# Patient Record
Sex: MALE | Race: WHITE | NOT HISPANIC OR LATINO | URBAN - METROPOLITAN AREA
[De-identification: names, ages, dates, MRNs, and addresses within clinical notes are randomized per-mention and may not be internally consistent; named-entity substitution may affect disease eponyms.]

---

## 2018-07-06 PROBLEM — Z00.00 ENCOUNTER FOR PREVENTIVE HEALTH EXAMINATION: Status: ACTIVE | Noted: 2018-07-06

## 2018-09-12 ENCOUNTER — EMERGENCY (EMERGENCY)
Facility: HOSPITAL | Age: 63
LOS: 1 days | Discharge: HOME | End: 2018-09-12
Attending: EMERGENCY MEDICINE
Payer: COMMERCIAL

## 2018-09-12 VITALS
RESPIRATION RATE: 18 BRPM | DIASTOLIC BLOOD PRESSURE: 97 MMHG | SYSTOLIC BLOOD PRESSURE: 159 MMHG | OXYGEN SATURATION: 100 % | TEMPERATURE: 98 F | WEIGHT: 255.07 LBS | HEIGHT: 72 IN | HEART RATE: 77 BPM

## 2018-09-12 PROCEDURE — 99283 EMERGENCY DEPT VISIT LOW MDM: CPT

## 2018-09-12 PROCEDURE — 99283 EMERGENCY DEPT VISIT LOW MDM: CPT | Mod: GC

## 2018-09-12 RX ORDER — IBUPROFEN 200 MG
800 TABLET ORAL ONCE
Qty: 0 | Refills: 0 | Status: COMPLETED | OUTPATIENT
Start: 2018-09-12 | End: 2018-09-12

## 2018-09-12 RX ORDER — DIAZEPAM 5 MG
10 TABLET ORAL ONCE
Qty: 0 | Refills: 0 | Status: DISCONTINUED | OUTPATIENT
Start: 2018-09-12 | End: 2018-09-12

## 2018-09-12 RX ORDER — DIAZEPAM 5 MG
1 TABLET ORAL
Qty: 10 | Refills: 0 | OUTPATIENT
Start: 2018-09-12 | End: 2018-09-15

## 2018-09-12 RX ORDER — ACETAMINOPHEN 500 MG
975 TABLET ORAL ONCE
Qty: 0 | Refills: 0 | Status: COMPLETED | OUTPATIENT
Start: 2018-09-12 | End: 2018-09-12

## 2018-09-12 RX ADMIN — Medication 800 MILLIGRAM(S): at 16:19

## 2018-09-12 RX ADMIN — Medication 975 MILLIGRAM(S): at 16:19

## 2018-09-12 RX ADMIN — Medication 10 MILLIGRAM(S): at 16:19

## 2018-09-12 NOTE — ED PROVIDER NOTE - OBJECTIVE STATEMENT
Skip Ramirez MD: 62 M w/ Hx of lumbar fusion in 2003, hypertension, compression fracture of thoracic vertebrae, hyperlipidemia, , who presents with acute worsening of mid to lower back pain while patient was sitting and waiting to see his doctor for an unrelated issue, worsened as he stood up. Associated with spasms along the back. No fever or chills, no numbness or weakness, no urinary or fecal retention or incontinence, no saddle anesthesia.   Ortho-Spine: Rordigo Landon Skip Ramirez MD: 62 M w/ Hx of lumbar fusion in 2003, hypertension, compression fracture of thoracic vertebrae, hyperlipidemia, , who presents with acute worsening of mid to lower back pain while patient was sitting and waiting to see his doctor for an unrelated issue, worsened as he stood up. Associated with spasms along the back. No fever or chills, no numbness or weakness, no urinary or fecal retention or incontinence, no saddle anesthesia.   Ortho-Spine: Rodrigo Landon       Attending note. Patient was seen in fast track from #3. Agree with the above. Patient complaining of severe spasms and pain in the middle of his back which radiated around to both sides of his abdomen. Patient has been having back pain since March and was initially treated as back spasms. Patient was evaluated and had an MRI of his spine in July which showed compression fracture of T12 as well as quad lesions in T10, 11 and 12.  Patient states that orthopedist told him it was too late to be braced due to time delay. Patient denies any numbness or paresthesia. Patient denies any bowel or bladder dysfunction. Pain is sharp electric shocklike and worse with movement. Patient has allergies to oxycodone and morphine. Patient is currently taking tramadol only for back pain.

## 2018-09-12 NOTE — ED PROVIDER NOTE - NS ED ROS FT
Review of Systems: No fever, no chills, no numbness, no weakness, + back pain, no chest pain, no shortness of breath, no loss of consciousness. ~ Skip Ramirez MD

## 2018-09-12 NOTE — ED PROVIDER NOTE - PLAN OF CARE
1) Please follow-up with your Primary Medical Doctor in 3-5 days. Please call the [facility] for further evaluation and treatment. Their phone number is [phone].   2) Return to the Emergency Department if you experiences: fevers, chills, numbness, weakness, or symptoms that are new or recurrent.  3) To alleviate the pain, please rest and take Tylenol 650 mg or Motrin 400 mg once every 6 hours as needed. If over-the-counter pain medications do not alleviate the pain, you may take Valium once every 8 hours as needed. Do not drive, swim, or operate heavy machinery while you are taking this medication as it can cause drowsiness. Use Salonpas lidocaine patch as directed on the over-the-counter medication instructions.

## 2018-09-12 NOTE — ED PROVIDER NOTE - PHYSICAL EXAMINATION
Physical Exam:   GEN: adult M who is in mild distress, AAOx3  HENT: NCAT, no stridor  EYES: PERRLA, EOMI  RESP: no resp distress  CV: normal rate and normal perfusion  ABD: nondistended  EXT: No edema, No bony deformity of extremities  MSK: no midline TTP to T or L spine, + spasms along paralumbar musculature  SKIN: No skin breaks, skin color normal for race  NEURO: CN grossly intact, No focal motor or sensory deficits in BLE with normal patellar and achilles reflexes, normal sensation in saddle region.   ~ Skip Ramirez MD Physical Exam:   GEN: adult M who is in mild distress, AAOx3  HENT: NCAT, no stridor  EYES: PERRLA, EOMI  RESP: no resp distress  CV: normal rate and normal perfusion  ABD: nondistended  EXT: No edema, No bony deformity of extremities  MSK: no midline TTP to T or L spine, + spasms along paralumbar musculature  SKIN: No skin breaks, skin color normal for race  NEURO: CN grossly intact, No focal motor or sensory deficits in BLE with normal patellar and achilles reflexes, normal sensation in saddle region.   ~ Skip Ramirez MD       Attending note. The patient is alert and in severe pain grimacing at times.  Chest and abdomen are nontender. Patient has tenderness in the lower thoracic spine and para thoracic muscles. This reproduces the patient's symptoms. There is no leg edema. Sensation is intact in all. DTRs are +2/4 equal and symmetrical. There is no clonus. Movement exacerbates the patient's back pain.

## 2018-09-12 NOTE — ED PROVIDER NOTE - PROGRESS NOTE DETAILS
Skip Ramirez MD: spoke w/ Dr. Landon's office, left message w/ , awaiting call back. Skip Ramirez MD: patient improved symptoms after medications. repeat examination without neuro deficits. ambulatory w/o deficits. continues to have no midline bony TTP to vertebrae. No call back from Barbi. Skip Ramirez MD: patient already discharged and left the ED when Dr. Landon called back. He was made aware of the patients presentation and worsening symptoms. He will f/u w/ him closely as an outpatient.

## 2018-09-12 NOTE — ED PROVIDER NOTE - MEDICAL DECISION MAKING DETAILS
Skip Ramirez MD: Atraumatic back pain with associated spasms.  no red flags concerning for a spinal cord compression or cauda equina. No infectious symptoms. Exam concerning for muscular spasm of the paraspinal muscles. Will give muscle relaxants with oral Valium along with oral pain control. No indication for imaging at this time. Skip Ramirez MD: Atraumatic back pain with associated spasms.  no red flags concerning for a spinal cord compression or cauda equina. No infectious symptoms. Exam concerning for muscular spasm of the paraspinal muscles. Will give muscle relaxants with oral Valium along with oral pain control. No indication for imaging at this time.       Attending note. Patient complaining of severe back pain. MRI was reviewed which showed compression fracture of T9 and lesions of T10-T11. Analgesia. There is no IV Valium available in the hospital. Called to Dr. Tomi Landon for recommendations if available.

## 2018-09-12 NOTE — ED PROVIDER NOTE - CARE PLAN
Principal Discharge DX:	Other acute back pain  Assessment and plan of treatment:	1) Please follow-up with your Primary Medical Doctor in 3-5 days. Please call the [facility] for further evaluation and treatment. Their phone number is [phone].   2) Return to the Emergency Department if you experiences: fevers, chills, numbness, weakness, or symptoms that are new or recurrent.  3) To alleviate the pain, please rest and take Tylenol 650 mg or Motrin 400 mg once every 6 hours as needed. If over-the-counter pain medications do not alleviate the pain, you may take Valium once every 8 hours as needed. Do not drive, swim, or operate heavy machinery while you are taking this medication as it can cause drowsiness. Use Salonpas lidocaine patch as directed on the over-the-counter medication instructions.

## 2018-09-22 ENCOUNTER — TRANSCRIPTION ENCOUNTER (OUTPATIENT)
Age: 63
End: 2018-09-22

## 2018-09-22 ENCOUNTER — INPATIENT (INPATIENT)
Facility: HOSPITAL | Age: 63
LOS: 8 days | Discharge: HOME | DRG: 456 | End: 2018-10-01
Attending: ORTHOPAEDIC SURGERY | Admitting: ORTHOPAEDIC SURGERY
Payer: COMMERCIAL

## 2018-09-22 VITALS
TEMPERATURE: 98 F | HEIGHT: 72 IN | RESPIRATION RATE: 20 BRPM | OXYGEN SATURATION: 97 % | HEART RATE: 85 BPM | WEIGHT: 255.07 LBS | SYSTOLIC BLOOD PRESSURE: 128 MMHG | DIASTOLIC BLOOD PRESSURE: 85 MMHG

## 2018-09-22 LAB
ALBUMIN SERPL ELPH-MCNC: 4 G/DL — SIGNIFICANT CHANGE UP (ref 3.3–5)
ALP SERPL-CCNC: 64 U/L — SIGNIFICANT CHANGE UP (ref 40–120)
ALT FLD-CCNC: 20 U/L — SIGNIFICANT CHANGE UP (ref 10–45)
ANION GAP SERPL CALC-SCNC: 12 MMOL/L — SIGNIFICANT CHANGE UP (ref 5–17)
APTT BLD: 34.6 SEC — SIGNIFICANT CHANGE UP (ref 27.5–37.4)
AST SERPL-CCNC: 22 U/L — SIGNIFICANT CHANGE UP (ref 10–40)
BASOPHILS # BLD AUTO: 0 K/UL — SIGNIFICANT CHANGE UP (ref 0–0.2)
BASOPHILS NFR BLD AUTO: 0.6 % — SIGNIFICANT CHANGE UP (ref 0–2)
BILIRUB SERPL-MCNC: 0.7 MG/DL — SIGNIFICANT CHANGE UP (ref 0.2–1.2)
BLD GP AB SCN SERPL QL: NEGATIVE — SIGNIFICANT CHANGE UP
BUN SERPL-MCNC: 14 MG/DL — SIGNIFICANT CHANGE UP (ref 7–23)
CALCIUM SERPL-MCNC: 10.4 MG/DL — SIGNIFICANT CHANGE UP (ref 8.4–10.5)
CHLORIDE SERPL-SCNC: 100 MMOL/L — SIGNIFICANT CHANGE UP (ref 96–108)
CO2 SERPL-SCNC: 28 MMOL/L — SIGNIFICANT CHANGE UP (ref 22–31)
CREAT SERPL-MCNC: 1 MG/DL — SIGNIFICANT CHANGE UP (ref 0.5–1.3)
EOSINOPHIL # BLD AUTO: 0.1 K/UL — SIGNIFICANT CHANGE UP (ref 0–0.5)
EOSINOPHIL NFR BLD AUTO: 1.3 % — SIGNIFICANT CHANGE UP (ref 0–6)
GLUCOSE SERPL-MCNC: 95 MG/DL — SIGNIFICANT CHANGE UP (ref 70–99)
HCT VFR BLD CALC: 40.4 % — SIGNIFICANT CHANGE UP (ref 39–50)
HGB BLD-MCNC: 14.8 G/DL — SIGNIFICANT CHANGE UP (ref 13–17)
INR BLD: 1.09 RATIO — SIGNIFICANT CHANGE UP (ref 0.88–1.16)
LYMPHOCYTES # BLD AUTO: 1.5 K/UL — SIGNIFICANT CHANGE UP (ref 1–3.3)
LYMPHOCYTES # BLD AUTO: 20.9 % — SIGNIFICANT CHANGE UP (ref 13–44)
MCHC RBC-ENTMCNC: 33.9 PG — SIGNIFICANT CHANGE UP (ref 27–34)
MCHC RBC-ENTMCNC: 36.5 GM/DL — HIGH (ref 32–36)
MCV RBC AUTO: 92.8 FL — SIGNIFICANT CHANGE UP (ref 80–100)
MONOCYTES # BLD AUTO: 0.7 K/UL — SIGNIFICANT CHANGE UP (ref 0–0.9)
MONOCYTES NFR BLD AUTO: 9.4 % — SIGNIFICANT CHANGE UP (ref 2–14)
NEUTROPHILS # BLD AUTO: 4.8 K/UL — SIGNIFICANT CHANGE UP (ref 1.8–7.4)
NEUTROPHILS NFR BLD AUTO: 67.8 % — SIGNIFICANT CHANGE UP (ref 43–77)
PLATELET # BLD AUTO: 244 K/UL — SIGNIFICANT CHANGE UP (ref 150–400)
POTASSIUM SERPL-MCNC: 4.1 MMOL/L — SIGNIFICANT CHANGE UP (ref 3.5–5.3)
POTASSIUM SERPL-SCNC: 4.1 MMOL/L — SIGNIFICANT CHANGE UP (ref 3.5–5.3)
PROT SERPL-MCNC: 7.1 G/DL — SIGNIFICANT CHANGE UP (ref 6–8.3)
PROTHROM AB SERPL-ACNC: 11.9 SEC — SIGNIFICANT CHANGE UP (ref 9.8–12.7)
RBC # BLD: 4.35 M/UL — SIGNIFICANT CHANGE UP (ref 4.2–5.8)
RBC # FLD: 12.5 % — SIGNIFICANT CHANGE UP (ref 10.3–14.5)
RH IG SCN BLD-IMP: NEGATIVE — SIGNIFICANT CHANGE UP
SODIUM SERPL-SCNC: 140 MMOL/L — SIGNIFICANT CHANGE UP (ref 135–145)
WBC # BLD: 7.1 K/UL — SIGNIFICANT CHANGE UP (ref 3.8–10.5)
WBC # FLD AUTO: 7.1 K/UL — SIGNIFICANT CHANGE UP (ref 3.8–10.5)

## 2018-09-22 PROCEDURE — 72070 X-RAY EXAM THORAC SPINE 2VWS: CPT | Mod: 26

## 2018-09-22 PROCEDURE — 99285 EMERGENCY DEPT VISIT HI MDM: CPT | Mod: 25

## 2018-09-22 PROCEDURE — 93010 ELECTROCARDIOGRAM REPORT: CPT | Mod: NC

## 2018-09-22 PROCEDURE — 72100 X-RAY EXAM L-S SPINE 2/3 VWS: CPT | Mod: 26

## 2018-09-22 PROCEDURE — 72040 X-RAY EXAM NECK SPINE 2-3 VW: CPT | Mod: 26

## 2018-09-22 PROCEDURE — 71045 X-RAY EXAM CHEST 1 VIEW: CPT | Mod: 26

## 2018-09-22 RX ORDER — IBUPROFEN 200 MG
600 TABLET ORAL ONCE
Qty: 0 | Refills: 0 | Status: COMPLETED | OUTPATIENT
Start: 2018-09-22 | End: 2018-09-22

## 2018-09-22 RX ORDER — DIAZEPAM 5 MG
5 TABLET ORAL ONCE
Qty: 0 | Refills: 0 | Status: DISCONTINUED | OUTPATIENT
Start: 2018-09-22 | End: 2018-09-22

## 2018-09-22 RX ORDER — ACETAMINOPHEN 500 MG
975 TABLET ORAL ONCE
Qty: 0 | Refills: 0 | Status: COMPLETED | OUTPATIENT
Start: 2018-09-22 | End: 2018-09-22

## 2018-09-22 RX ADMIN — Medication 600 MILLIGRAM(S): at 20:02

## 2018-09-22 RX ADMIN — Medication 975 MILLIGRAM(S): at 20:02

## 2018-09-22 RX ADMIN — Medication 5 MILLIGRAM(S): at 20:02

## 2018-09-22 NOTE — ED ADULT NURSE NOTE - CHPI ED NUR SYMPTOMS NEG
no blurred vision/no dizziness/no loss of consciousness/no numbness/no change in level of consciousness/no fever/no nausea/no vomiting/no confusion

## 2018-09-22 NOTE — ED PROVIDER NOTE - OBJECTIVE STATEMENT
62M PSH lumbar spine fusion 2003 and recent T12 compression fracture c/o LE weakness x2 days. Pt was in ER recently for back pain. Has been following up with pain management. Over past few days pt reports increasing weakness in his legs. Is now walking around with cane then walker with help of family around house. Reports for last two days he has been having urinary incontinence at night. Reports having sensation he needs to urinate, but is incontinent before reaching the bathroom. No bowel incontinence. No saddle anesthesia. No f/c, n/v/d.

## 2018-09-22 NOTE — ED ADULT NURSE NOTE - NSIMPLEMENTINTERV_GEN_ALL_ED
Implemented All Fall Risk Interventions:  [place] to call system. Call bell, personal items and telephone within reach. Instruct patient to call for assistance. Room bathroom lighting operational. Non-slip footwear when patient is off stretcher. Physically safe environment: no spills, clutter or unnecessary equipment. Stretcher in lowest position, wheels locked, appropriate side rails in place. Provide visual cue, wrist band, yellow gown, etc. Monitor gait and stability. Monitor for mental status changes and reorient to person, place, and time. Review medications for side effects contributing to fall risk. Reinforce activity limits and safety measures with patient and family.

## 2018-09-22 NOTE — ED ADULT NURSE NOTE - OBJECTIVE STATEMENT
Pt is a 63 yo M who came to the ED amb with assistance c/o weakness, numbness in legs and loss of bladder control. States he was dx with T9 compression fx in 7/18 though he had pain in the area since 3/18. Pt seen here 9/12 for intractable pain, given Valium with relief and D/c home. Since 9/15 pt has weakness in legs, feels like legs are "falling asleep", now has numbness in legs and had loss of bladder control x3 yesterday and x 2 today. Pt has difficulty ambulating, has to be assisted by at least 2 others to walk.; Pt having increased falls this week. A/o x3. + weakness in legs.

## 2018-09-22 NOTE — ED PROVIDER NOTE - NS ED ROS FT
Constitution: No Fever or chills  Eyes: No visual changes  HEENT: No URI symptoms  Cardio: No Chest pain  Resp: No SOB  GI: No abdominal pain  : +urinary incontinence, no dysuria, no urgency   MSK: +Back pain  Neuro: +weakness in legs, numbness in L leg, no Headache  Skin: No rashes  Reyes Chou, PGY-1

## 2018-09-22 NOTE — CHART NOTE - NSCHARTNOTEFT_GEN_A_CORE
Patient seen and examined  C/O balance issues and weakness in b/l le, numbness LLE>RLE, Urinary Urgency x 3 days,  Full Note to follow  Awaiting MRI C/T/L Spine

## 2018-09-22 NOTE — ED ADULT TRIAGE NOTE - CHIEF COMPLAINT QUOTE
Diagnosed with T9 vertebra fx in July. C.o numbness in legs since Saturday, can not control bladder for the past two days and abd pain. Can feel sensation to LE, warmth on palpation.

## 2018-09-22 NOTE — ED PROVIDER NOTE - PHYSICAL EXAMINATION
General: Alert and Orientated x 3.  Head: Normocephalic and atraumatic.  Eyes: PERRL with EOMI.  Neck: Supple. Trachea midline.   Cardiac: Normal S1 and S2 w/ RRR. No murmurs appreciated. No JVD appreciated.  Pulmonary: Vesicular breath sounds bilaterally. No increased WOB. No wheezes or crackles.  Abdominal: Soft, non-tender.   Neurologic: Strength 4/5 in LLE, 5/5 in URE. Sensation intact bilaterally. Strength 5/5 in upper extremities, No focal sensory or motor deficits.  Musculoskeletal: Strength appropriate in all 4 extremities for age with no limited ROM.  Skin: Color appropriate for race. Intact, warm, and well-perfused.  Lymphatic: No cervical adenopathy appreciated.  Psychiatric: Appropriate mood and affect. No apparent risk to self or others.  Reyes Chou, PGY-1 General: Alert and Orientated x 3.  Head: Normocephalic and atraumatic.  Eyes: PERRL with EOMI.  Neck: Supple. Trachea midline.   Cardiac: Normal S1 and S2 w/ RRR. No murmurs appreciated. No JVD appreciated.  Pulmonary: Vesicular breath sounds bilaterally. No increased WOB. No wheezes or crackles.  Abdominal: Soft, non-tender.   Neurologic: Strength 3/5 in LLE, 5/5 in LRE. Sensation intact bilaterally. Strength 5/5 in upper extremities, rectal tone nl, No saddle anesthesia. No focal sensory or motor deficits.  Musculoskeletal: Strength appropriate in all 4 extremities for age with no limited ROM.  Skin: Color appropriate for race. Intact, warm, and well-perfused.  Lymphatic: No cervical adenopathy appreciated.  Psychiatric: Appropriate mood and affect. No apparent risk to self or others.  Reyes Chou, PGY-1 General: Alert and Orientated x 3.  Head: Normocephalic and atraumatic.  Eyes: PERRL with EOMI.  Neck: Supple. Trachea midline.   Cardiac: Normal S1 and S2 w/ RRR. No murmurs appreciated. No JVD appreciated.  Pulmonary: Vesicular breath sounds bilaterally. No increased WOB. No wheezes or crackles.  Abdominal: Soft, non-tender.   Neurologic: Strength 3/5 in LLE, 5/5 in LRE. Sensation intact bilaterally. Strength 5/5 in upper extremities, rectal tone nl, No saddle anesthesia.   Musculoskeletal: no limited ROM.  Skin: Color appropriate for race. Intact, warm, and well-perfused.  Lymphatic: No cervical adenopathy appreciated.  Psychiatric: Appropriate mood and affect. No apparent risk to self or others.  Reyes Chou, PGY-1

## 2018-09-22 NOTE — ED PROVIDER NOTE - ATTENDING CONTRIBUTION TO CARE
Attending Note (Cosmo): patient complaining of back pain and left leg weakness and urinary incontinence. left leg weaker than right. spine surgery consult, mri.

## 2018-09-22 NOTE — ED PROVIDER NOTE - MEDICAL DECISION MAKING DETAILS
62M PSH lumbar spine fusion, recent T12 compression fracture p/w increasing leg weakness and bladder incontinence for 2 day. Displays some weakness in LLE which appears to be new. Rectal tone nl, no saddle anesthesia. Pain control- reevaluate. Spine recs, +/-MRI if no improvement. 62M PSH lumbar spine fusion, recent T12 compression fracture p/w increasing leg weakness and bladder incontinence for 2 day. Displays some weakness in LLE which appears to be new. Rectal tone nl, no saddle anesthesia. Pain control- reevaluate. Spine recs, MRI to r/o cord compression

## 2018-09-23 ENCOUNTER — RESULT REVIEW (OUTPATIENT)
Age: 63
End: 2018-09-23

## 2018-09-23 DIAGNOSIS — G95.20 UNSPECIFIED CORD COMPRESSION: ICD-10-CM

## 2018-09-23 DIAGNOSIS — K64.9 UNSPECIFIED HEMORRHOIDS: Chronic | ICD-10-CM

## 2018-09-23 DIAGNOSIS — Z90.79 ACQUIRED ABSENCE OF OTHER GENITAL ORGAN(S): Chronic | ICD-10-CM

## 2018-09-23 DIAGNOSIS — M48.061 SPINAL STENOSIS, LUMBAR REGION WITHOUT NEUROGENIC CLAUDICATION: Chronic | ICD-10-CM

## 2018-09-23 DIAGNOSIS — E32.9 DISEASE OF THYMUS, UNSPECIFIED: Chronic | ICD-10-CM

## 2018-09-23 DIAGNOSIS — Z98.890 OTHER SPECIFIED POSTPROCEDURAL STATES: Chronic | ICD-10-CM

## 2018-09-23 LAB
ALBUMIN SERPL ELPH-MCNC: 3.7 G/DL — SIGNIFICANT CHANGE UP (ref 3.3–5)
ALP SERPL-CCNC: 61 U/L — SIGNIFICANT CHANGE UP (ref 40–120)
ALT FLD-CCNC: 18 U/L — SIGNIFICANT CHANGE UP (ref 10–45)
ANION GAP SERPL CALC-SCNC: 10 MMOL/L — SIGNIFICANT CHANGE UP (ref 5–17)
ANION GAP SERPL CALC-SCNC: 12 MMOL/L — SIGNIFICANT CHANGE UP (ref 5–17)
APTT BLD: 35.2 SEC — SIGNIFICANT CHANGE UP (ref 27.5–37.4)
APTT BLD: 56.9 SEC — HIGH (ref 27.5–37.4)
AST SERPL-CCNC: 20 U/L — SIGNIFICANT CHANGE UP (ref 10–40)
BILIRUB SERPL-MCNC: 0.7 MG/DL — SIGNIFICANT CHANGE UP (ref 0.2–1.2)
BLD GP AB SCN SERPL QL: NEGATIVE — SIGNIFICANT CHANGE UP
BUN SERPL-MCNC: 14 MG/DL — SIGNIFICANT CHANGE UP (ref 7–23)
BUN SERPL-MCNC: 15 MG/DL — SIGNIFICANT CHANGE UP (ref 7–23)
CALCIUM SERPL-MCNC: 9.6 MG/DL — SIGNIFICANT CHANGE UP (ref 8.4–10.5)
CALCIUM SERPL-MCNC: 9.9 MG/DL — SIGNIFICANT CHANGE UP (ref 8.4–10.5)
CHLORIDE SERPL-SCNC: 100 MMOL/L — SIGNIFICANT CHANGE UP (ref 96–108)
CHLORIDE SERPL-SCNC: 102 MMOL/L — SIGNIFICANT CHANGE UP (ref 96–108)
CO2 SERPL-SCNC: 24 MMOL/L — SIGNIFICANT CHANGE UP (ref 22–31)
CO2 SERPL-SCNC: 26 MMOL/L — SIGNIFICANT CHANGE UP (ref 22–31)
CREAT SERPL-MCNC: 0.95 MG/DL — SIGNIFICANT CHANGE UP (ref 0.5–1.3)
CREAT SERPL-MCNC: 1.03 MG/DL — SIGNIFICANT CHANGE UP (ref 0.5–1.3)
GAS PNL BLDA: SIGNIFICANT CHANGE UP
GLUCOSE SERPL-MCNC: 112 MG/DL — HIGH (ref 70–99)
GLUCOSE SERPL-MCNC: 143 MG/DL — HIGH (ref 70–99)
HCT VFR BLD CALC: 39.1 % — SIGNIFICANT CHANGE UP (ref 39–50)
HCT VFR BLD CALC: 40.4 % — SIGNIFICANT CHANGE UP (ref 39–50)
HGB BLD-MCNC: 14.2 G/DL — SIGNIFICANT CHANGE UP (ref 13–17)
HGB BLD-MCNC: 14.6 G/DL — SIGNIFICANT CHANGE UP (ref 13–17)
INR BLD: 1.11 RATIO — SIGNIFICANT CHANGE UP (ref 0.88–1.16)
INR BLD: 1.14 RATIO — SIGNIFICANT CHANGE UP (ref 0.88–1.16)
MAGNESIUM SERPL-MCNC: 2 MG/DL — SIGNIFICANT CHANGE UP (ref 1.6–2.6)
MCHC RBC-ENTMCNC: 33 PG — SIGNIFICANT CHANGE UP (ref 27–34)
MCHC RBC-ENTMCNC: 33.6 PG — SIGNIFICANT CHANGE UP (ref 27–34)
MCHC RBC-ENTMCNC: 36 GM/DL — SIGNIFICANT CHANGE UP (ref 32–36)
MCHC RBC-ENTMCNC: 36.3 GM/DL — HIGH (ref 32–36)
MCV RBC AUTO: 91.6 FL — SIGNIFICANT CHANGE UP (ref 80–100)
MCV RBC AUTO: 92.6 FL — SIGNIFICANT CHANGE UP (ref 80–100)
PHOSPHATE SERPL-MCNC: 4.9 MG/DL — HIGH (ref 2.5–4.5)
PLATELET # BLD AUTO: 218 K/UL — SIGNIFICANT CHANGE UP (ref 150–400)
PLATELET # BLD AUTO: 271 K/UL — SIGNIFICANT CHANGE UP (ref 150–400)
POTASSIUM SERPL-MCNC: 4 MMOL/L — SIGNIFICANT CHANGE UP (ref 3.5–5.3)
POTASSIUM SERPL-MCNC: 4.1 MMOL/L — SIGNIFICANT CHANGE UP (ref 3.5–5.3)
POTASSIUM SERPL-SCNC: 4 MMOL/L — SIGNIFICANT CHANGE UP (ref 3.5–5.3)
POTASSIUM SERPL-SCNC: 4.1 MMOL/L — SIGNIFICANT CHANGE UP (ref 3.5–5.3)
PROT SERPL-MCNC: 6.5 G/DL — SIGNIFICANT CHANGE UP (ref 6–8.3)
PROTHROM AB SERPL-ACNC: 12.1 SEC — SIGNIFICANT CHANGE UP (ref 9.8–12.7)
PROTHROM AB SERPL-ACNC: 12.4 SEC — SIGNIFICANT CHANGE UP (ref 9.8–12.7)
RBC # BLD: 4.22 M/UL — SIGNIFICANT CHANGE UP (ref 4.2–5.8)
RBC # BLD: 4.42 M/UL — SIGNIFICANT CHANGE UP (ref 4.2–5.8)
RBC # FLD: 11.8 % — SIGNIFICANT CHANGE UP (ref 10.3–14.5)
RBC # FLD: 12.3 % — SIGNIFICANT CHANGE UP (ref 10.3–14.5)
RH IG SCN BLD-IMP: NEGATIVE — SIGNIFICANT CHANGE UP
SODIUM SERPL-SCNC: 134 MMOL/L — LOW (ref 135–145)
SODIUM SERPL-SCNC: 140 MMOL/L — SIGNIFICANT CHANGE UP (ref 135–145)
WBC # BLD: 13.5 K/UL — HIGH (ref 3.8–10.5)
WBC # BLD: 6 K/UL — SIGNIFICANT CHANGE UP (ref 3.8–10.5)
WBC # FLD AUTO: 13.5 K/UL — HIGH (ref 3.8–10.5)
WBC # FLD AUTO: 6 K/UL — SIGNIFICANT CHANGE UP (ref 3.8–10.5)

## 2018-09-23 PROCEDURE — 72128 CT CHEST SPINE W/O DYE: CPT | Mod: 26

## 2018-09-23 PROCEDURE — 88307 TISSUE EXAM BY PATHOLOGIST: CPT | Mod: 26

## 2018-09-23 PROCEDURE — 88342 IMHCHEM/IMCYTCHM 1ST ANTB: CPT | Mod: 26,59

## 2018-09-23 PROCEDURE — 88365 INSITU HYBRIDIZATION (FISH): CPT | Mod: 26

## 2018-09-23 PROCEDURE — 88341 IMHCHEM/IMCYTCHM EA ADD ANTB: CPT | Mod: 26,59

## 2018-09-23 PROCEDURE — 72141 MRI NECK SPINE W/O DYE: CPT | Mod: 26

## 2018-09-23 PROCEDURE — 72148 MRI LUMBAR SPINE W/O DYE: CPT | Mod: 26

## 2018-09-23 PROCEDURE — 72157 MRI CHEST SPINE W/O & W/DYE: CPT | Mod: 26

## 2018-09-23 PROCEDURE — 88331 PATH CONSLTJ SURG 1 BLK 1SPC: CPT | Mod: 26

## 2018-09-23 PROCEDURE — 88360 TUMOR IMMUNOHISTOCHEM/MANUAL: CPT | Mod: 26

## 2018-09-23 PROCEDURE — 99291 CRITICAL CARE FIRST HOUR: CPT

## 2018-09-23 RX ORDER — CEFAZOLIN SODIUM 1 G
2000 VIAL (EA) INJECTION EVERY 8 HOURS
Qty: 0 | Refills: 0 | Status: DISCONTINUED | OUTPATIENT
Start: 2018-09-23 | End: 2018-09-28

## 2018-09-23 RX ORDER — PANTOPRAZOLE SODIUM 20 MG/1
40 TABLET, DELAYED RELEASE ORAL DAILY
Qty: 0 | Refills: 0 | Status: DISCONTINUED | OUTPATIENT
Start: 2018-09-23 | End: 2018-09-23

## 2018-09-23 RX ORDER — ACETAMINOPHEN 500 MG
650 TABLET ORAL EVERY 6 HOURS
Qty: 0 | Refills: 0 | Status: DISCONTINUED | OUTPATIENT
Start: 2018-09-23 | End: 2018-09-23

## 2018-09-23 RX ORDER — INFLUENZA VIRUS VACCINE 15; 15; 15; 15 UG/.5ML; UG/.5ML; UG/.5ML; UG/.5ML
0.5 SUSPENSION INTRAMUSCULAR ONCE
Qty: 0 | Refills: 0 | Status: COMPLETED | OUTPATIENT
Start: 2018-09-23 | End: 2018-10-01

## 2018-09-23 RX ORDER — SODIUM CHLORIDE 9 MG/ML
1000 INJECTION, SOLUTION INTRAVENOUS
Qty: 0 | Refills: 0 | Status: DISCONTINUED | OUTPATIENT
Start: 2018-09-23 | End: 2018-09-23

## 2018-09-23 RX ORDER — SENNA PLUS 8.6 MG/1
2 TABLET ORAL AT BEDTIME
Qty: 0 | Refills: 0 | Status: DISCONTINUED | OUTPATIENT
Start: 2018-09-23 | End: 2018-09-23

## 2018-09-23 RX ORDER — OXYCODONE HYDROCHLORIDE 5 MG/1
5 TABLET ORAL EVERY 4 HOURS
Qty: 0 | Refills: 0 | Status: DISCONTINUED | OUTPATIENT
Start: 2018-09-23 | End: 2018-09-30

## 2018-09-23 RX ORDER — EVOLOCUMAB 140 MG/ML
0 INJECTION, SOLUTION SUBCUTANEOUS
Qty: 0 | Refills: 0 | COMMUNITY

## 2018-09-23 RX ORDER — DIAZEPAM 5 MG
1 TABLET ORAL
Qty: 0 | Refills: 0 | COMMUNITY

## 2018-09-23 RX ORDER — DULOXETINE HYDROCHLORIDE 30 MG/1
1 CAPSULE, DELAYED RELEASE ORAL
Qty: 0 | Refills: 0 | COMMUNITY

## 2018-09-23 RX ORDER — DIAZEPAM 5 MG
5 TABLET ORAL EVERY 8 HOURS
Qty: 0 | Refills: 0 | Status: DISCONTINUED | OUTPATIENT
Start: 2018-09-23 | End: 2018-09-23

## 2018-09-23 RX ORDER — SODIUM CHLORIDE 9 MG/ML
1000 INJECTION INTRAMUSCULAR; INTRAVENOUS; SUBCUTANEOUS
Qty: 0 | Refills: 0 | Status: DISCONTINUED | OUTPATIENT
Start: 2018-09-23 | End: 2018-09-23

## 2018-09-23 RX ORDER — IRBESARTAN 75 MG/1
1 TABLET ORAL
Qty: 0 | Refills: 0 | COMMUNITY

## 2018-09-23 RX ORDER — DOCUSATE SODIUM 100 MG
100 CAPSULE ORAL THREE TIMES A DAY
Qty: 0 | Refills: 0 | Status: DISCONTINUED | OUTPATIENT
Start: 2018-09-23 | End: 2018-10-01

## 2018-09-23 RX ORDER — DOCUSATE SODIUM 100 MG
100 CAPSULE ORAL THREE TIMES A DAY
Qty: 0 | Refills: 0 | Status: DISCONTINUED | OUTPATIENT
Start: 2018-09-23 | End: 2018-09-23

## 2018-09-23 RX ORDER — OXYCODONE HYDROCHLORIDE 5 MG/1
10 TABLET ORAL EVERY 4 HOURS
Qty: 0 | Refills: 0 | Status: DISCONTINUED | OUTPATIENT
Start: 2018-09-23 | End: 2018-09-23

## 2018-09-23 RX ORDER — TRAMADOL HYDROCHLORIDE 50 MG/1
50 TABLET ORAL EVERY 6 HOURS
Qty: 0 | Refills: 0 | Status: DISCONTINUED | OUTPATIENT
Start: 2018-09-23 | End: 2018-09-23

## 2018-09-23 RX ORDER — METOCLOPRAMIDE HCL 10 MG
10 TABLET ORAL EVERY 6 HOURS
Qty: 0 | Refills: 0 | Status: DISCONTINUED | OUTPATIENT
Start: 2018-09-23 | End: 2018-09-23

## 2018-09-23 RX ORDER — SENNA PLUS 8.6 MG/1
2 TABLET ORAL AT BEDTIME
Qty: 0 | Refills: 0 | Status: DISCONTINUED | OUTPATIENT
Start: 2018-09-23 | End: 2018-10-01

## 2018-09-23 RX ORDER — TRAMADOL HYDROCHLORIDE 50 MG/1
50 TABLET ORAL EVERY 6 HOURS
Qty: 0 | Refills: 0 | Status: DISCONTINUED | OUTPATIENT
Start: 2018-09-23 | End: 2018-09-24

## 2018-09-23 RX ORDER — DEXAMETHASONE 0.5 MG/5ML
10 ELIXIR ORAL ONCE
Qty: 0 | Refills: 0 | Status: COMPLETED | OUTPATIENT
Start: 2018-09-23 | End: 2018-09-23

## 2018-09-23 RX ORDER — ACETAMINOPHEN 500 MG
650 TABLET ORAL EVERY 6 HOURS
Qty: 0 | Refills: 0 | Status: DISCONTINUED | OUTPATIENT
Start: 2018-09-23 | End: 2018-09-24

## 2018-09-23 RX ORDER — DIAZEPAM 5 MG
1 TABLET ORAL
Qty: 8 | Refills: 0 | OUTPATIENT
Start: 2018-09-23 | End: 2018-09-26

## 2018-09-23 RX ADMIN — OXYCODONE HYDROCHLORIDE 5 MILLIGRAM(S): 5 TABLET ORAL at 20:41

## 2018-09-23 RX ADMIN — TRAMADOL HYDROCHLORIDE 50 MILLIGRAM(S): 50 TABLET ORAL at 04:47

## 2018-09-23 RX ADMIN — SODIUM CHLORIDE 50 MILLILITER(S): 9 INJECTION, SOLUTION INTRAVENOUS at 20:12

## 2018-09-23 RX ADMIN — Medication 100 MILLIGRAM(S): at 20:12

## 2018-09-23 RX ADMIN — Medication 100 MILLIGRAM(S): at 20:13

## 2018-09-23 RX ADMIN — Medication 5 MILLIGRAM(S): at 03:39

## 2018-09-23 RX ADMIN — TRAMADOL HYDROCHLORIDE 50 MILLIGRAM(S): 50 TABLET ORAL at 05:30

## 2018-09-23 RX ADMIN — Medication 102 MILLIGRAM(S): at 03:40

## 2018-09-23 RX ADMIN — TRAMADOL HYDROCHLORIDE 50 MILLIGRAM(S): 50 TABLET ORAL at 23:57

## 2018-09-23 RX ADMIN — TRAMADOL HYDROCHLORIDE 50 MILLIGRAM(S): 50 TABLET ORAL at 23:27

## 2018-09-23 RX ADMIN — SENNA PLUS 2 TABLET(S): 8.6 TABLET ORAL at 20:12

## 2018-09-23 RX ADMIN — Medication 600 MILLIGRAM(S): at 01:24

## 2018-09-23 RX ADMIN — OXYCODONE HYDROCHLORIDE 5 MILLIGRAM(S): 5 TABLET ORAL at 20:11

## 2018-09-23 RX ADMIN — Medication 975 MILLIGRAM(S): at 01:24

## 2018-09-23 NOTE — H&P ADULT - ASSESSMENT
63yo M w/ Acute on Chronic worsening of low back pain with radiation down left leg, urinary urgency x 3 days and ataxia    MRI C/T/L Spine to evaluate compression fracture of thoracic vertebrae and determine if he has compression of his cord as sign of his symptoms  Pain control  Muscle Relaxers  NPO for possible OR tomorrow  FU MRI Reads w/ radiology  No anticoagulation at this time  WBAT  FU Labs  CT T Spine

## 2018-09-23 NOTE — CONSULT NOTE ADULT - ATTENDING COMMENTS
T9 pathological fx s/p laminectomy fusion of spine  Hemodynamically stable neurologically intact  For frequent vascular checks  On 50% VM saturating 100%m wean off FiO2, CXR to assess  Keep MAP > 70 as per ortho spine  Oliguria in OR resolved, making good urine on PO, normal renal function, DC IVF  Pain control  PT T9 pathological fx s/p laminectomy fusion of spine  Hemodynamically stable neurologically intact  For frequent vascular checks  On 50% VM saturating 100%m wean off FiO2, CXR to assess  Keep MAP > 70 as per ortho spine  Oliguria in OR resolved, making good urine on PO, normal renal function, DC IVF, monitor hyponatremia, pt on LR  Pain control  PT

## 2018-09-23 NOTE — CONSULT NOTE ADULT - ASSESSMENT
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 and severe cord compression with cord edema s/p thoracic T6-T11 laminectomy and posterior spinal fusion 9/23    PLAN:   Neurologic: T6-T11 laminectomy and posterior spinal fusion, acute pain  - q1 neurovascular checks  - Tylenol and oxycodone prn for pain  - PT WBAT, ambulate only with TLSO brace on    Respiratory: no acute issues  - Incentive spirometry, OOB to prevent atelectasis    Cardiovascular: no acute issues  - MAP goal >75 mmHg as per orthospine    Gastrointestinal/Nutrition: no acute issues  - Regular diet  - Bowel regimen    Genitourinary/Renal: no acute issues  - Santana for I&Os, D/C in AM  - LR @ 50ml/hr, IV lock when tolerating diet    Hematologic: Needs VTE prophylaxis  - Start Lovenox 40mg SC qd 24 hours post-operatively as per orthospine  - Venodynes, OOB     Infectious Disease: perioperative prophylaxis  - Cefazolin prophylaxis while ABDIAS drains in place as per orthospine    Endocrine: no acute issues  - Monitor glucose on BMP    Disposition: SICU. Full code.    -Annette Shane PA-C  [id number]

## 2018-09-23 NOTE — H&P ADULT - ATTENDING COMMENTS
Pt seen and examined. Pt came to the ED overnight with progressive/intractable TL pain, weakness, inability to ambulate, incontinence. On exam, 4/5 RLE, hip flexors 2/5 B, L Q 4/5, L TA/EHL/GS 3/5, global numbness of the LLE, hyperreflexia. MRI shows progressive deterioration/moth eaten appearance of T9 with epidural tumor extension and severe cord compression with cord edema. Given his clinical condition/neurologic deterioration, weakness/progressive inability to ambulate/cord compression, emergent surgery is advised. R/B/A/C of operative tx reviewed including but not limited to infection, bleeding/hematoma, dural leak/dural tear, need for additional operations/revisions, persistent or worsening pain, numbness, parasthesias, weakness, catastrophic neurologic damage/paralysis, persistent inability to ambulate, need for assistive devices, persistent incontinence requiring catherization/etc, as well as medical/anesthetic risks including MI, VTE, PNA, UTI, blindness, multisystem organ failure and death. Pt agrees to proceed and signed fully informed consent for T6-T12 laminectomy/fusion with instrumentation, use of autograft, allograft, possible off-label use of BMP-2. No guarantees given or implied.

## 2018-09-23 NOTE — H&P ADULT - HISTORY OF PRESENT ILLNESS
CHIEF COMPLAINT:     HPI: Pt is a 62y complains of falls with worsening gait, requiring a cane 1 week ago and 3 days ago he necessitated a walker and yesterday was unable to ambulate stating his legs were giving out on him, patient also reports 3 days history of difficulty controlling his urine, Urgency and inability to get to the bathroom before urinating. Patient was diagnosed with thoracic compression fracture a month ago and had seen Dr. Landon, had scheduled appointment to see him this coming Thursday but came to the ED because symptoms had become so severe. Wife at bedside, denies changes in bowel, States that pain radiates down his left leg and he has intermittent numbness of the LLE.    Patient is status post L4-S1 PSF in 2003 at [facility]      PAST MEDICAL & SURGICAL HISTORY:  Myopathy  Hypercholesteremia  Hypertension    Vital Signs Last 24 Hrs  T(C): 37 (22 Sep 2018 21:26), Max: 37 (22 Sep 2018 21:26)  T(F): 98.6 (22 Sep 2018 21:26), Max: 98.6 (22 Sep 2018 21:26)  HR: 68 (22 Sep 2018 21:26) (68 - 85)  BP: 145/89 (22 Sep 2018 21:26) (128/85 - 145/89)  BP(mean): --  RR: 16 (22 Sep 2018 21:26) (16 - 20)  SpO2: 98% (22 Sep 2018 21:26) (97% - 98%)    I&O's Detail  LABS:                      14.8   7.1   )-----------( 244      ( 22 Sep 2018 22:52 )             40.4     09-22  140  |  100  |  14  ----------------------------<  95  4.1   |  28  |  1.00    Ca    10.4      22 Sep 2018 22:52  TPro  7.1  /  Alb  4.0  /  TBili  0.7  /  DBili  x   /  AST  22  /  ALT  20  /  AlkPhos  64  09-22  PT/INR - ( 22 Sep 2018 22:52 )   PT: 11.9 sec;   INR: 1.09 ratio    PTT - ( 22 Sep 2018 22:52 )  PTT:34.6 sec    RADIOLOGY & ADDITIONAL STUDIES:    PHYSICAL EXAM:  General; Awake and alert, Oriented x 3  Hips/Pelvis:   ABle to SLR bilaterally. Negative log roll, heel strike. No pain on passive Int/Ext hip rotation.  Spine exam:  Neck: supple, NT, Full Painless baseline AROM  Back: TTP Thoracic spine  Spine:                       Motor exam:          Right Upper extremity: Delt 5/5, Biceps 5/5, Triceps 5/5, Wrist Ext 5/5, Wrist Flexion 5/5,  Strength 5/5         SILT C5-S1, No Clonus, Negative Chung, +RP, Compartments soft           Left Upper extremity: Delt 5/5, Biceps 5/5, Triceps 5/5, Wrist Ext 5/5, Wrist Flexion 5/5,  Strength 5/5         SILT C5-S1, No Clonus, Negative Chung, +RP, Compartments soft           Right Lower Extremity: Hyperreflexive patella tendon reflex 3+, Hip Flexion 4/5, Hip Extension 4/5, Knee Flexion 4/5, Knee Extension 4/5, Ankle Dorsiflexion 5/5, Ankle Plantar Flexion 5/5, Extensor hallucis Longus 4/5         SILT L2-S1, No pain with SLR, Negative Clonus, Negative Babinski                  Left Lower Extremity: Hyperreflexive patella tendon reflex 3+, Hip Flexion 4/5, Hip Extension 4/5, Knee Flexion 4/5, Knee Extension 4/5, Ankle Dorsiflexion 5/5,  Ankle Plantar Flexion 5/5, Extensor hallucis Longus 4/5         Decreased sensation compared with contralateral side L3/4/5,  No pain with SLR, Negative Clonus, Negative Babinski

## 2018-09-23 NOTE — H&P ADULT - PMH
Hypercholesteremia    Hypertension    Myopathy BPH (benign prostatic hyperplasia)  s/p TURP not on Flomax  Hypercholesteremia    Hypertension    Myopathy

## 2018-09-23 NOTE — CONSULT NOTE ADULT - SUBJECTIVE AND OBJECTIVE BOX
HISTORY OF PRESENT ILLNESS:  HERMAN TAVERA is a 62y Male who presented with complaint of falls with worsening gait, requiring a cane 1 week ago and 3 days prior to arrival he necessitated a walker and 1 day prior to arrival was unable to ambulate, stating his legs were giving out on him. Patient also reported 3 days history of difficulty controlling his urine, urgency, and inability to get to the bathroom before urinating. Patient was diagnosed with thoracic compression fracture a month ago and had seen Dr. Landon, had scheduled appointment to see him this coming Thursday 9/27 7but came to the ED because symptoms had become so severe. Wife at bedside, denies changes in bowel, states that pain radiates down his left leg and he has intermittent numbness of the LLE. Patient is status post L4-S1 PSF in 2003 at [facility].    MRI shows progressive deterioration/moth eaten appearance of T9 with epidural tumor extension and severe cord compression with cord edema. Pt was taken to the OR emergently with orthospine on 9/23/18 and is now s/p thoracic laminectomy with T6-11 posterior spinal fusion. Case was about 4 hours, EBL 300ml, IVF 2600ml, UOP 70ml. Pt was extubate at end of case and transferred to SICU for q1 hour neurovascular checks and maintenance of MAP >75.      PAST MEDICAL HISTORY: Myopathy  Hypercholesteremia  Hypertension  No pertinent past medical history      PAST SURGICAL HISTORY: L4-S1 PSF in 2003 at [facility].    FAMILY HISTORY: non contributory      CODE STATUS: Full code    HOME MEDICATIONS: valium    ALLERGIES: morphine (Unknown)  Percocet 2.5/325 (Unknown)  statins (Unknown)      VITAL SIGNS:  ICU Vital Signs Last 24 Hrs  T(C): 36.6 (23 Sep 2018 18:30), Max: 37 (22 Sep 2018 21:26)  T(F): 97.9 (23 Sep 2018 18:30), Max: 98.6 (22 Sep 2018 21:26)  HR: 81 (23 Sep 2018 19:00) (65 - 83)  BP: 120/73 (23 Sep 2018 09:42) (120/73 - 160/92)  ABP: 153/79 (23 Sep 2018 19:00) (149/72 - 153/79)  ABP(mean): 101 (23 Sep 2018 19:00) (96 - 101)  RR: 17 (23 Sep 2018 19:00) (16 - 20)  SpO2: 99% (23 Sep 2018 19:00) (94% - 99%)      NEURO  Exam: awake, alert and oriented x4, NAD. Motor exam 5/5 strength in all 4 extremities. Sensation intact.  Meds:acetaminophen   Tablet .. 650 milliGRAM(s) Oral every 6 hours PRN Mild Pain (1 - 3)  oxyCODONE    IR 5 milliGRAM(s) Oral every 4 hours PRN Moderate Pain (4 - 6)  traMADol 50 milliGRAM(s) Oral every 6 hours PRN Severe Pain (7 - 10)      RESPIRATORY  ABG - ( 23 Sep 2018 19:05 )  pH: 7.36  /  pCO2: 44    /  pO2: 114   / HCO3: 24    / Base Excess: -1.2  /  SaO2: 98      Blood Gas Arterial, Lactate: 1.4 mmol/L (09.23.18 @ 19:05)    Exam: clear to auscultation bilaterally  Meds: x    CARDIOVASCULAR  Exam: regular rate and rhythm  Cardiac Rhythm: sinus  Meds: x    GI/NUTRITION  Exam: softly distended, nontender  Diet: Regular  Meds:docusate sodium 100 milliGRAM(s) Oral three times a day  senna 2 Tablet(s) Oral at bedtime      GENITOURINARY/RENAL  Meds:lactated ringers. 1000 milliLiter(s) IV Continuous <Continuous>      09-23 @ 07:01  -  09-23 @ 19:36  --------------------------------------------------------  IN:  Total IN: 0 mL    OUT:    Bulb: 50 mL    Bulb: 50 mL    Voided: 300 mL  Total OUT: 400 mL    Total NET: -400 mL        Weight (kg): 115.7 (09-23 @ 07:24)  09-23    134<L>  |  100  |  15  ----------------------------<  143<H>  4.1   |  24  |  1.03    Ca    9.6      23 Sep 2018 19:08  Phos  4.9     09-23  Mg     2.0     09-23    TPro  6.5  /  Alb  3.7  /  TBili  0.7  /  DBili  x   /  AST  20  /  ALT  18  /  AlkPhos  61  09-23    [x ] Santana catheter, indication: urine output monitoring in critically ill patient    HEMATOLOGIC  [- ] VTE Prophylaxis:                          14.2   6.0   )-----------( 218      ( 23 Sep 2018 05:09 )             39.1     PT/INR - ( 23 Sep 2018 19:08 )   PT: 12.4 sec;   INR: 1.14 ratio         PTT - ( 23 Sep 2018 19:08 )  PTT:56.9 sec  Transfusion: [ ] PRBC	[ ] Platelets	[ ] FFP	[ ] Cryoprecipitate      INFECTIOUS DISEASES  Meds:ceFAZolin   IVPB 2000 milliGRAM(s) IV Intermittent every 8 hours  influenza   Vaccine 0.5 milliLiter(s) IntraMuscular once    RECENT CULTURES: x      ENDOCRINE  Meds: x  CAPILLARY BLOOD GLUCOSE          PATIENT CARE ACCESS DEVICES:  [ x] Peripheral IV  [ ] Central Venous Line	[ ] R	[ ] L	[ ] IJ	[ ] Fem	[ ] SC	Placed:   [ x] Arterial Line		[ ] R	[x ] L	[ ] Fem	[ x] Rad	[ ] Ax	Placed: 9/23  [ ] PICC:					[ ] Mediport  [x ] Urinary Catheter, Date Placed: 9/23  [x] Necessity of urinary, arterial, and venous catheters discussed    OTHER MEDICATIONS: x    IMAGING STUDIES: x HISTORY OF PRESENT ILLNESS:  HERMAN TAVERA is a 62y Male with HTN, HLD who presented with complaint of falls with worsening gait, requiring a cane 1 week ago and 3 days prior to arrival he necessitated a walker and 1 day prior to arrival was unable to ambulate, stating his legs were giving out on him. Patient also reported 3 days history of difficulty controlling his urine, urgency, and inability to get to the bathroom before urinating. Patient was diagnosed with thoracic compression fracture over the summer and had seen Dr. Landon, had scheduled appointment to see him this   coming Thursday 9/27 but came to the ED because symptoms had become so severe. Wife at bedside, denies changes in bowel, states that pain radiates down his left leg and he has intermittent numbness of the LLE. Patient is status post L4-S1 PSF in 2003 at [facility].    Patient had been having back pain since March and was initially treated for back spasms. He had an MRI in July that showed compression fracture of T12 as well as quad lesions in T10, 11 and 12. On current admission, repeat MRI shows progressive deterioration/moth eaten appearance of T9 with epidural tumor extension and severe cord compression with cord edema. Pt was taken to the OR emergently with orthospine on 9/23/18 and is now s/p thoracic laminectomy with T6-11 posterior spinal fusion. Case was about 4 hours, EBL 300ml, IVF 2600ml, UOP 70ml. Pt was extubate at end of case and transferred to SICU for q1 hour neurovascular checks and maintenance of MAP >75.    < from: MR Thoracic Spine w/wo IV Cont (09.23.18 @ 00:37) >    IMPRESSION:      Pathologic metastatic fracture deformity of T9 with loss of 80% of   vertebral height andretropulsion into spinal canal by 6 mm with   extraosseous tumoral prevertebral and epidural involvement compressing   the thoracic cord which demonstrates hyperintense T2 and STIR signal   concerning for cord edema and/or myelomalacia with a small amount of CSF   along the dorsal margin of the spinal canal.    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    Decreased T1 and hyperintense STIR signal within the sacrum involving the   posterior elements at the S2 level concerning for osseous metastasis.   Postoperative fixation screws in the left  L4 vertebra and sacrum, bone   grafts L4-5 and L5-S1 level, only noncontrast sagittal T1 and STIR   images, strongly suggest imaging with gadolinium to assess tumor and   granulation scar tissue.        PAST MEDICAL HISTORY: Myopathy  Hypercholesteremia  Hypertension  No pertinent past medical history      PAST SURGICAL HISTORY: L4-S1 PSF in 2003 at [facility].    FAMILY HISTORY: non contributory    CODE STATUS: Full code    HOME MEDICATIONS: valium    ALLERGIES: morphine (Unknown)  Percocet 2.5/325 (Unknown)  statins (Unknown)      VITAL SIGNS:  ICU Vital Signs Last 24 Hrs  T(C): 36.6 (23 Sep 2018 18:30), Max: 37 (22 Sep 2018 21:26)  T(F): 97.9 (23 Sep 2018 18:30), Max: 98.6 (22 Sep 2018 21:26)  HR: 81 (23 Sep 2018 19:00) (65 - 83)  BP: 120/73 (23 Sep 2018 09:42) (120/73 - 160/92)  ABP: 153/79 (23 Sep 2018 19:00) (149/72 - 153/79)  ABP(mean): 101 (23 Sep 2018 19:00) (96 - 101)  RR: 17 (23 Sep 2018 19:00) (16 - 20)  SpO2: 99% (23 Sep 2018 19:00) (94% - 99%)      NEURO  Exam: awake, alert and oriented x4, NAD. Motor exam 5/5 strength in all 4 extremities. Sensation intact.  Meds:acetaminophen   Tablet .. 650 milliGRAM(s) Oral every 6 hours PRN Mild Pain (1 - 3)  oxyCODONE    IR 5 milliGRAM(s) Oral every 4 hours PRN Moderate Pain (4 - 6)  traMADol 50 milliGRAM(s) Oral every 6 hours PRN Severe Pain (7 - 10)      RESPIRATORY  ABG - ( 23 Sep 2018 19:05 )  pH: 7.36  /  pCO2: 44    /  pO2: 114   / HCO3: 24    / Base Excess: -1.2  /  SaO2: 98      Blood Gas Arterial, Lactate: 1.4 mmol/L (09.23.18 @ 19:05)    Exam: clear to auscultation bilaterally  Meds: x    CARDIOVASCULAR  Exam: regular rate and rhythm  Cardiac Rhythm: sinus  Meds: x    GI/NUTRITION  Exam: softly distended, nontender  Diet: Regular  Meds:docusate sodium 100 milliGRAM(s) Oral three times a day  senna 2 Tablet(s) Oral at bedtime      GENITOURINARY/RENAL  Meds:lactated ringers. 1000 milliLiter(s) IV Continuous <Continuous>      09-23 @ 07:01  -  09-23 @ 19:36  --------------------------------------------------------  IN:  Total IN: 0 mL    OUT:    Bulb: 50 mL    Bulb: 50 mL    Voided: 300 mL  Total OUT: 400 mL    Total NET: -400 mL        Weight (kg): 115.7 (09-23 @ 07:24)  09-23    134<L>  |  100  |  15  ----------------------------<  143<H>  4.1   |  24  |  1.03    Ca    9.6      23 Sep 2018 19:08  Phos  4.9     09-23  Mg     2.0     09-23    TPro  6.5  /  Alb  3.7  /  TBili  0.7  /  DBili  x   /  AST  20  /  ALT  18  /  AlkPhos  61  09-23    [x ] Santana catheter, indication: urine output monitoring in critically ill patient    HEMATOLOGIC  [- ] VTE Prophylaxis:                          14.2   6.0   )-----------( 218      ( 23 Sep 2018 05:09 )             39.1     PT/INR - ( 23 Sep 2018 19:08 )   PT: 12.4 sec;   INR: 1.14 ratio         PTT - ( 23 Sep 2018 19:08 )  PTT:56.9 sec  Transfusion: [ ] PRBC	[ ] Platelets	[ ] FFP	[ ] Cryoprecipitate      INFECTIOUS DISEASES  Meds:ceFAZolin   IVPB 2000 milliGRAM(s) IV Intermittent every 8 hours  influenza   Vaccine 0.5 milliLiter(s) IntraMuscular once    RECENT CULTURES: x      ENDOCRINE  Meds: x  CAPILLARY BLOOD GLUCOSE          PATIENT CARE ACCESS DEVICES:  [ x] Peripheral IV  [ ] Central Venous Line	[ ] R	[ ] L	[ ] IJ	[ ] Fem	[ ] SC	Placed:   [ x] Arterial Line		[ ] R	[x ] L	[ ] Fem	[ x] Rad	[ ] Ax	Placed: 9/23  [ ] PICC:					[ ] Mediport  [x ] Urinary Catheter, Date Placed: 9/23  [x] Necessity of urinary, arterial, and venous catheters discussed    OTHER MEDICATIONS: x    IMAGING STUDIES:  < from: MR Thoracic Spine w/wo IV Cont (09.23.18 @ 00:37) >   IMPRESSION:     Pathologic metastatic fracture deformity of T9 with loss of 80% of vertebral   height and retropulsion into spinal canal by 6 mm with extraosseous tumoral   prevertebral and epidural involvement compressing the thoracic cord which   demonstrates hyperintense T2 and STIR signal concerning for cord edema   and/or myelomalacia with a small amount of CSF along the dorsal margin of   the spinal canal.     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     Decreased T1 and hyperintense STIR signal within the sacrum involving the   posterior elements at the S2 level concerning for osseous metastasis.   Postoperative fixation screws in the left L4 vertebra and sacrum, bone   grafts L4-5 and L5-S1 level, only noncontrast sagittal T1 and STIR images,   strongly suggest imaging with gadolinium to assess tumor and HISTORY OF PRESENT ILLNESS:  HERMAN TAVERA is a 62y Male with HTN, HLD who presented with complaint of falls with worsening gait, requiring a cane 1 week ago and 3 days prior to arrival he necessitated a walker and 1 day prior to arrival was unable to ambulate, stating his legs were giving out on him. Patient also reported 3 days history of difficulty controlling his urine, urgency, and inability to get to the bathroom before urinating. Patient was diagnosed with thoracic compression fracture over the summer and had seen Dr. Landon, had scheduled appointment to see him this   coming Thursday 9/27 but came to the ED because symptoms had become so severe. Wife at bedside, denies changes in bowel, states that pain radiates down his left leg and he has intermittent numbness of the LLE. Patient is status post L4-S1 PSF in 2003 at [facility].    Patient had been having back pain since March and was initially treated for back spasms. He had an MRI in July that showed compression fracture of T12 as well as quad lesions in T10, 11 and 12. On current admission, repeat MRI shows progressive deterioration/moth eaten appearance of T9 with epidural tumor extension and severe cord compression with cord edema. Pt was taken to the OR emergently with orthospine on 9/23/18 and is now s/p thoracic laminectomy with T6-11 posterior spinal fusion. Case was about 4 hours, EBL 300ml, IVF 2600ml, UOP 70ml. Pt was extubate at end of case and transferred to SICU for q1 hour neurovascular checks and maintenance of MAP >75.    < from: MR Thoracic Spine w/wo IV Cont (09.23.18 @ 00:37) >    IMPRESSION:      Pathologic metastatic fracture deformity of T9 with loss of 80% of   vertebral height andretropulsion into spinal canal by 6 mm with   extraosseous tumoral prevertebral and epidural involvement compressing   the thoracic cord which demonstrates hyperintense T2 and STIR signal   concerning for cord edema and/or myelomalacia with a small amount of CSF   along the dorsal margin of the spinal canal.    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    Decreased T1 and hyperintense STIR signal within the sacrum involving the   posterior elements at the S2 level concerning for osseous metastasis.   Postoperative fixation screws in the left  L4 vertebra and sacrum, bone   grafts L4-5 and L5-S1 level, only noncontrast sagittal T1 and STIR   images, strongly suggest imaging with gadolinium to assess tumor and   granulation scar tissue.        PAST MEDICAL HISTORY: Myopathy  Hypercholesteremia  Hypertension    PAST SURGICAL HISTORY: L4-S1 PSF in 2003 at [facility].    FAMILY HISTORY: non contributory    CODE STATUS: Full code    HOME MEDICATIONS: valium    ALLERGIES: morphine (Unknown)  Percocet 2.5/325 (Unknown)  statins (Unknown)      VITAL SIGNS:  ICU Vital Signs Last 24 Hrs  T(C): 36.6 (23 Sep 2018 18:30), Max: 37 (22 Sep 2018 21:26)  T(F): 97.9 (23 Sep 2018 18:30), Max: 98.6 (22 Sep 2018 21:26)  HR: 81 (23 Sep 2018 19:00) (65 - 83)  BP: 120/73 (23 Sep 2018 09:42) (120/73 - 160/92)  ABP: 153/79 (23 Sep 2018 19:00) (149/72 - 153/79)  ABP(mean): 101 (23 Sep 2018 19:00) (96 - 101)  RR: 17 (23 Sep 2018 19:00) (16 - 20)  SpO2: 99% (23 Sep 2018 19:00) (94% - 99%)      NEURO  Exam: awake, alert and oriented x4, NAD. Motor exam 5/5 strength in all 4 extremities. Sensation intact.  Meds:acetaminophen   Tablet .. 650 milliGRAM(s) Oral every 6 hours PRN Mild Pain (1 - 3)  oxyCODONE    IR 5 milliGRAM(s) Oral every 4 hours PRN Moderate Pain (4 - 6)  traMADol 50 milliGRAM(s) Oral every 6 hours PRN Severe Pain (7 - 10)      RESPIRATORY  ABG - ( 23 Sep 2018 19:05 )  pH: 7.36  /  pCO2: 44    /  pO2: 114   / HCO3: 24    / Base Excess: -1.2  /  SaO2: 98      Blood Gas Arterial, Lactate: 1.4 mmol/L (09.23.18 @ 19:05)    Exam: clear to auscultation bilaterally  Meds: x    CARDIOVASCULAR  Exam: regular rate and rhythm  Cardiac Rhythm: sinus  Meds: x    GI/NUTRITION  Exam: softly distended, nontender  Diet: Regular  Meds:docusate sodium 100 milliGRAM(s) Oral three times a day  senna 2 Tablet(s) Oral at bedtime      GENITOURINARY/RENAL  Meds:lactated ringers. 1000 milliLiter(s) IV Continuous <Continuous>      09-23 @ 07:01  -  09-23 @ 19:36  --------------------------------------------------------  IN:  Total IN: 0 mL    OUT:    Bulb: 50 mL    Bulb: 50 mL    Voided: 300 mL  Total OUT: 400 mL    Total NET: -400 mL        Weight (kg): 115.7 (09-23 @ 07:24)  09-23    134<L>  |  100  |  15  ----------------------------<  143<H>  4.1   |  24  |  1.03    Ca    9.6      23 Sep 2018 19:08  Phos  4.9     09-23  Mg     2.0     09-23    TPro  6.5  /  Alb  3.7  /  TBili  0.7  /  DBili  x   /  AST  20  /  ALT  18  /  AlkPhos  61  09-23    [x ] Santana catheter, indication: urine output monitoring in critically ill patient    HEMATOLOGIC  [- ] VTE Prophylaxis:                          14.2   6.0   )-----------( 218      ( 23 Sep 2018 05:09 )             39.1     PT/INR - ( 23 Sep 2018 19:08 )   PT: 12.4 sec;   INR: 1.14 ratio         PTT - ( 23 Sep 2018 19:08 )  PTT:56.9 sec  Transfusion: [ ] PRBC	[ ] Platelets	[ ] FFP	[ ] Cryoprecipitate      INFECTIOUS DISEASES  Meds:ceFAZolin   IVPB 2000 milliGRAM(s) IV Intermittent every 8 hours  influenza   Vaccine 0.5 milliLiter(s) IntraMuscular once    RECENT CULTURES: x      ENDOCRINE  Meds: x  CAPILLARY BLOOD GLUCOSE          PATIENT CARE ACCESS DEVICES:  [ x] Peripheral IV  [ ] Central Venous Line	[ ] R	[ ] L	[ ] IJ	[ ] Fem	[ ] SC	Placed:   [ x] Arterial Line		[ ] R	[x ] L	[ ] Fem	[ x] Rad	[ ] Ax	Placed: 9/23  [ ] PICC:					[ ] Mediport  [x ] Urinary Catheter, Date Placed: 9/23  [x] Necessity of urinary, arterial, and venous catheters discussed    OTHER MEDICATIONS: x    IMAGING STUDIES:  < from: MR Thoracic Spine w/wo IV Cont (09.23.18 @ 00:37) >   IMPRESSION:     Pathologic metastatic fracture deformity of T9 with loss of 80% of vertebral   height and retropulsion into spinal canal by 6 mm with extraosseous tumoral   prevertebral and epidural involvement compressing the thoracic cord which   demonstrates hyperintense T2 and STIR signal concerning for cord edema   and/or myelomalacia with a small amount of CSF along the dorsal margin of   the spinal canal.     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     Decreased T1 and hyperintense STIR signal within the sacrum involving the   posterior elements at the S2 level concerning for osseous metastasis.   Postoperative fixation screws in the left L4 vertebra and sacrum, bone   grafts L4-5 and L5-S1 level, only noncontrast sagittal T1 and STIR images,   strongly suggest imaging with gadolinium to assess tumor and

## 2018-09-23 NOTE — BRIEF OPERATIVE NOTE - POST-OP DX
Pathologic fracture of thoracic vertebrae  09/23/2018    Active  Abe Foster  Thoracic myelopathy  09/23/2018    Active  Abe Foster

## 2018-09-23 NOTE — PATIENT PROFILE ADULT. - FUNCTIONAL SCREEN CURRENT LEVEL: TOILETING, MLM
Hershall Mom is a 23year old male.   Patient presents with:  Cerumen Impaction: patient presents for ear cleaning      HISTORY OF PRESENT ILLNESS    Patient presents for cerumen removal. No other complaints or concerns at this time    Social Histo Patient Position: Sitting, Cuff Size: large)   Temp 97.9 °F (36.6 °C) (Tympanic)   Ht 5' 4\" (1.626 m)   Wt (!) 320 lb (145.2 kg)   BMI 54.93 kg/m²        Constitutional Normal Overall appearance - Normal.        Neck Exam Normal Inspection - Normal. Palpa future. [employer].  Des Awad MD    11/9/2017    5:26 PM (3) assistive equipment and person

## 2018-09-23 NOTE — BRIEF OPERATIVE NOTE - PROCEDURE
<<-----Click on this checkbox to enter Procedure Thoracic laminectomy with fusion at 3 or more levels  09/23/2018    Active  LYLESEN

## 2018-09-24 PROBLEM — N40.0 BENIGN PROSTATIC HYPERPLASIA WITHOUT LOWER URINARY TRACT SYMPTOMS: Chronic | Status: ACTIVE | Noted: 2018-09-23

## 2018-09-24 LAB
ANION GAP SERPL CALC-SCNC: 11 MMOL/L — SIGNIFICANT CHANGE UP (ref 5–17)
APTT BLD: 31.7 SEC — SIGNIFICANT CHANGE UP (ref 27.5–37.4)
BUN SERPL-MCNC: 14 MG/DL — SIGNIFICANT CHANGE UP (ref 7–23)
CA-I BLD-SCNC: 1.29 MMOL/L — SIGNIFICANT CHANGE UP (ref 1.12–1.3)
CALCIUM SERPL-MCNC: 9.4 MG/DL — SIGNIFICANT CHANGE UP (ref 8.4–10.5)
CHLORIDE SERPL-SCNC: 101 MMOL/L — SIGNIFICANT CHANGE UP (ref 96–108)
CO2 SERPL-SCNC: 23 MMOL/L — SIGNIFICANT CHANGE UP (ref 22–31)
CREAT SERPL-MCNC: 0.83 MG/DL — SIGNIFICANT CHANGE UP (ref 0.5–1.3)
GLUCOSE SERPL-MCNC: 135 MG/DL — HIGH (ref 70–99)
HCT VFR BLD CALC: 37.9 % — LOW (ref 39–50)
HGB BLD-MCNC: 13.5 G/DL — SIGNIFICANT CHANGE UP (ref 13–17)
INR BLD: 1.15 RATIO — SIGNIFICANT CHANGE UP (ref 0.88–1.16)
MAGNESIUM SERPL-MCNC: 2.1 MG/DL — SIGNIFICANT CHANGE UP (ref 1.6–2.6)
MCHC RBC-ENTMCNC: 32.6 PG — SIGNIFICANT CHANGE UP (ref 27–34)
MCHC RBC-ENTMCNC: 35.7 GM/DL — SIGNIFICANT CHANGE UP (ref 32–36)
MCV RBC AUTO: 91.3 FL — SIGNIFICANT CHANGE UP (ref 80–100)
PHOSPHATE SERPL-MCNC: 4.6 MG/DL — HIGH (ref 2.5–4.5)
PLATELET # BLD AUTO: 257 K/UL — SIGNIFICANT CHANGE UP (ref 150–400)
POTASSIUM SERPL-MCNC: 4 MMOL/L — SIGNIFICANT CHANGE UP (ref 3.5–5.3)
POTASSIUM SERPL-SCNC: 4 MMOL/L — SIGNIFICANT CHANGE UP (ref 3.5–5.3)
PROTHROM AB SERPL-ACNC: 12.5 SEC — SIGNIFICANT CHANGE UP (ref 9.8–12.7)
RBC # BLD: 4.15 M/UL — LOW (ref 4.2–5.8)
RBC # FLD: 12.1 % — SIGNIFICANT CHANGE UP (ref 10.3–14.5)
SODIUM SERPL-SCNC: 135 MMOL/L — SIGNIFICANT CHANGE UP (ref 135–145)
WBC # BLD: 15.5 K/UL — HIGH (ref 3.8–10.5)
WBC # FLD AUTO: 15.5 K/UL — HIGH (ref 3.8–10.5)

## 2018-09-24 PROCEDURE — 71045 X-RAY EXAM CHEST 1 VIEW: CPT | Mod: 26

## 2018-09-24 PROCEDURE — 88291 CYTO/MOLECULAR REPORT: CPT

## 2018-09-24 PROCEDURE — 99233 SBSQ HOSP IP/OBS HIGH 50: CPT | Mod: GC

## 2018-09-24 RX ORDER — ACETAMINOPHEN 500 MG
975 TABLET ORAL EVERY 6 HOURS
Qty: 0 | Refills: 0 | Status: DISCONTINUED | OUTPATIENT
Start: 2018-09-24 | End: 2018-10-01

## 2018-09-24 RX ORDER — DULOXETINE HYDROCHLORIDE 30 MG/1
60 CAPSULE, DELAYED RELEASE ORAL DAILY
Qty: 0 | Refills: 0 | Status: DISCONTINUED | OUTPATIENT
Start: 2018-09-24 | End: 2018-10-01

## 2018-09-24 RX ORDER — ENOXAPARIN SODIUM 100 MG/ML
40 INJECTION SUBCUTANEOUS EVERY 24 HOURS
Qty: 0 | Refills: 0 | Status: DISCONTINUED | OUTPATIENT
Start: 2018-09-24 | End: 2018-10-01

## 2018-09-24 RX ORDER — DIAZEPAM 5 MG
5 TABLET ORAL ONCE
Qty: 0 | Refills: 0 | Status: DISCONTINUED | OUTPATIENT
Start: 2018-09-24 | End: 2018-09-24

## 2018-09-24 RX ADMIN — DULOXETINE HYDROCHLORIDE 60 MILLIGRAM(S): 30 CAPSULE, DELAYED RELEASE ORAL at 14:29

## 2018-09-24 RX ADMIN — Medication 100 MILLIGRAM(S): at 21:16

## 2018-09-24 RX ADMIN — OXYCODONE HYDROCHLORIDE 5 MILLIGRAM(S): 5 TABLET ORAL at 05:40

## 2018-09-24 RX ADMIN — OXYCODONE HYDROCHLORIDE 5 MILLIGRAM(S): 5 TABLET ORAL at 05:10

## 2018-09-24 RX ADMIN — Medication 100 MILLIGRAM(S): at 14:28

## 2018-09-24 RX ADMIN — TRAMADOL HYDROCHLORIDE 50 MILLIGRAM(S): 50 TABLET ORAL at 21:46

## 2018-09-24 RX ADMIN — Medication 100 MILLIGRAM(S): at 14:29

## 2018-09-24 RX ADMIN — ENOXAPARIN SODIUM 40 MILLIGRAM(S): 100 INJECTION SUBCUTANEOUS at 17:51

## 2018-09-24 RX ADMIN — Medication 5 MILLIGRAM(S): at 23:42

## 2018-09-24 RX ADMIN — Medication 100 MILLIGRAM(S): at 05:06

## 2018-09-24 RX ADMIN — TRAMADOL HYDROCHLORIDE 50 MILLIGRAM(S): 50 TABLET ORAL at 21:16

## 2018-09-24 RX ADMIN — TRAMADOL HYDROCHLORIDE 50 MILLIGRAM(S): 50 TABLET ORAL at 08:25

## 2018-09-24 RX ADMIN — TRAMADOL HYDROCHLORIDE 50 MILLIGRAM(S): 50 TABLET ORAL at 07:38

## 2018-09-24 RX ADMIN — SENNA PLUS 2 TABLET(S): 8.6 TABLET ORAL at 21:16

## 2018-09-24 NOTE — PROGRESS NOTE ADULT - SUBJECTIVE AND OBJECTIVE BOX
Orthopaedic Surgery Progress Note    Subjective:   Patient seen and examined  Feels great since surgery  Denies LE numbness/tingling/weakness  No current complaints, very satisfied with his current care    Objective:  T(C): 36.5 (09-23-18 @ 23:00), Max: 36.6 (09-23-18 @ 01:52)  HR: 71 (09-23-18 @ 23:00) (65 - 83)  BP: 120/73 (09-23-18 @ 09:42) (120/73 - 160/92)  RR: 17 (09-23-18 @ 23:00) (14 - 20)  SpO2: 92% (09-23-18 @ 23:00) (92% - 99%)    09-23 @ 07:01  -  09-24 @ 00:21  --------------------------------------------------------  IN: 940 mL / OUT: 1335 mL / NET: -395 mL    PE  NAD  Back:   dressing C/D/I, mild appropriate marija-incisional ttp  JPx2 in place w/ serosanguinous output  Neuro:  RLE IP 5/5 HS 5/5 Q 5/5 GS 5/5 TA 5/5 EHL 5/5   SILT L2-S1  LLE IP 5/5 HS 5/5 Q 5/5 GS 5/5 TA 5/5 EHL 5/5  SILT L2-S1  WWP BLE  Santana in place                        14.6   13.5  )-----------( 271      ( 23 Sep 2018 19:08 )             40.4     09-23    134<L>  |  100  |  15  ----------------------------<  143<H>  4.1   |  24  |  1.03    Ca    9.6      23 Sep 2018 19:08  Phos  4.9     09-23  Mg     2.0     09-23    TPro  6.5  /  Alb  3.7  /  TBili  0.7  /  DBili  x   /  AST  20  /  ALT  18  /  AlkPhos  61  09-23    PT/INR - ( 23 Sep 2018 19:08 )   PT: 12.4 sec;   INR: 1.14 ratio       PTT - ( 23 Sep 2018 19:08 )  PTT:56.9 sec    62y Male POD1 s/p T7-T10 laminectomy, T6-T11 PSF for T9 pathologic fracture, doing well post-operatively    - Pain control  - FU labs  - WBAT  - PT/OT/OOB  - I/S  - Monitor drain output  - Ancef while drains in  - SCDs, can start lovenox 40 qD 24 hrs from surgery  - Maintain MAP > 75 for 24 hrs  - Needs TLSO brace  - FU path  - FU medicine (Dr. Veliz) and oncology (dr. De Anda) recs  - Care per [facility]    Abe Foster MD Orthopaedic Surgery Progress Note    Subjective:   Patient seen and examined  Feels great since surgery  Denies LE numbness/tingling/weakness  No current complaints, very satisfied with his current care    Objective:  T(C): 36.5 (09-23-18 @ 23:00), Max: 36.6 (09-23-18 @ 01:52)  HR: 71 (09-23-18 @ 23:00) (65 - 83)  BP: 120/73 (09-23-18 @ 09:42) (120/73 - 160/92)  RR: 17 (09-23-18 @ 23:00) (14 - 20)  SpO2: 92% (09-23-18 @ 23:00) (92% - 99%)    09-23 @ 07:01  -  09-24 @ 00:21  --------------------------------------------------------  IN: 940 mL / OUT: 1335 mL / NET: -395 mL    PE  NAD  Back:   dressing C/D/I, mild appropriate marija-incisional ttp  JPx2 in place w/ serosanguinous output  Neuro:  RLE IP 5/5 HS 5/5 Q 5/5 GS 5/5 TA 5/5 EHL 5/5   SILT L2-S1  LLE IP 5/5 HS 5/5 Q 5/5 GS 5/5 TA 5/5 EHL 5/5  SILT L2-S1  2 beats of clonus BLE  Negative babinski BLE  WWP BLE  Santana in place                        14.6   13.5  )-----------( 271      ( 23 Sep 2018 19:08 )             40.4     09-23    134<L>  |  100  |  15  ----------------------------<  143<H>  4.1   |  24  |  1.03    Ca    9.6      23 Sep 2018 19:08  Phos  4.9     09-23  Mg     2.0     09-23    TPro  6.5  /  Alb  3.7  /  TBili  0.7  /  DBili  x   /  AST  20  /  ALT  18  /  AlkPhos  61  09-23    PT/INR - ( 23 Sep 2018 19:08 )   PT: 12.4 sec;   INR: 1.14 ratio       PTT - ( 23 Sep 2018 19:08 )  PTT:56.9 sec    62y Male POD1 s/p T7-T10 laminectomy, T6-T11 PSF for T9 pathologic fracture, doing well post-operatively    - Pain control  - FU labs  - WBAT  - PT/OT/OOB  - I/S  - Monitor drain output  - Ancef while drains in  - SCDs, can start lovenox 40 qD 24 hrs from surgery  - Maintain MAP > 75 for 24 hrs  - Needs TLSO brace  - FU path  - FU medicine (Dr. Veliz) and oncology (dr. De Anda) recs  - Care per [facility]    Abe Foster MD

## 2018-09-25 LAB
ANION GAP SERPL CALC-SCNC: 7 MMOL/L — SIGNIFICANT CHANGE UP (ref 5–17)
BUN SERPL-MCNC: 19 MG/DL — SIGNIFICANT CHANGE UP (ref 7–23)
CALCIUM SERPL-MCNC: 9.2 MG/DL — SIGNIFICANT CHANGE UP (ref 8.4–10.5)
CHLORIDE SERPL-SCNC: 103 MMOL/L — SIGNIFICANT CHANGE UP (ref 96–108)
CO2 SERPL-SCNC: 26 MMOL/L — SIGNIFICANT CHANGE UP (ref 22–31)
CREAT SERPL-MCNC: 0.89 MG/DL — SIGNIFICANT CHANGE UP (ref 0.5–1.3)
GLUCOSE SERPL-MCNC: 108 MG/DL — HIGH (ref 70–99)
HCT VFR BLD CALC: 33.1 % — LOW (ref 39–50)
HCT VFR BLD CALC: 34.8 % — LOW (ref 39–50)
HGB BLD-MCNC: 11.9 G/DL — LOW (ref 13–17)
HGB BLD-MCNC: 12.3 G/DL — LOW (ref 13–17)
MAGNESIUM SERPL-MCNC: 2 MG/DL — SIGNIFICANT CHANGE UP (ref 1.6–2.6)
MCHC RBC-ENTMCNC: 32.9 PG — SIGNIFICANT CHANGE UP (ref 27–34)
MCHC RBC-ENTMCNC: 33.5 PG — SIGNIFICANT CHANGE UP (ref 27–34)
MCHC RBC-ENTMCNC: 35.3 GM/DL — SIGNIFICANT CHANGE UP (ref 32–36)
MCHC RBC-ENTMCNC: 35.9 GM/DL — SIGNIFICANT CHANGE UP (ref 32–36)
MCV RBC AUTO: 93.1 FL — SIGNIFICANT CHANGE UP (ref 80–100)
MCV RBC AUTO: 93.4 FL — SIGNIFICANT CHANGE UP (ref 80–100)
PHOSPHATE SERPL-MCNC: 2.7 MG/DL — SIGNIFICANT CHANGE UP (ref 2.5–4.5)
PLATELET # BLD AUTO: 181 K/UL — SIGNIFICANT CHANGE UP (ref 150–400)
PLATELET # BLD AUTO: 188 K/UL — SIGNIFICANT CHANGE UP (ref 150–400)
POTASSIUM SERPL-MCNC: 3.9 MMOL/L — SIGNIFICANT CHANGE UP (ref 3.5–5.3)
POTASSIUM SERPL-SCNC: 3.9 MMOL/L — SIGNIFICANT CHANGE UP (ref 3.5–5.3)
RBC # BLD: 3.55 M/UL — LOW (ref 4.2–5.8)
RBC # BLD: 3.74 M/UL — LOW (ref 4.2–5.8)
RBC # FLD: 12.1 % — SIGNIFICANT CHANGE UP (ref 10.3–14.5)
RBC # FLD: 12.7 % — SIGNIFICANT CHANGE UP (ref 10.3–14.5)
SODIUM SERPL-SCNC: 136 MMOL/L — SIGNIFICANT CHANGE UP (ref 135–145)
TM INTERPRETATION: SIGNIFICANT CHANGE UP
WBC # BLD: 9 K/UL — SIGNIFICANT CHANGE UP (ref 3.8–10.5)
WBC # BLD: 9.4 K/UL — SIGNIFICANT CHANGE UP (ref 3.8–10.5)
WBC # FLD AUTO: 9 K/UL — SIGNIFICANT CHANGE UP (ref 3.8–10.5)
WBC # FLD AUTO: 9.4 K/UL — SIGNIFICANT CHANGE UP (ref 3.8–10.5)

## 2018-09-25 PROCEDURE — 71045 X-RAY EXAM CHEST 1 VIEW: CPT | Mod: 26

## 2018-09-25 RX ORDER — POTASSIUM PHOSPHATE, MONOBASIC POTASSIUM PHOSPHATE, DIBASIC 236; 224 MG/ML; MG/ML
15 INJECTION, SOLUTION INTRAVENOUS ONCE
Qty: 0 | Refills: 0 | Status: COMPLETED | OUTPATIENT
Start: 2018-09-25 | End: 2018-09-25

## 2018-09-25 RX ORDER — CHLORHEXIDINE GLUCONATE 213 G/1000ML
1 SOLUTION TOPICAL
Qty: 0 | Refills: 0 | Status: DISCONTINUED | OUTPATIENT
Start: 2018-09-25 | End: 2018-09-27

## 2018-09-25 RX ORDER — DIAZEPAM 5 MG
5 TABLET ORAL
Qty: 0 | Refills: 0 | Status: DISCONTINUED | OUTPATIENT
Start: 2018-09-25 | End: 2018-10-01

## 2018-09-25 RX ADMIN — Medication 5 MILLIGRAM(S): at 06:57

## 2018-09-25 RX ADMIN — POTASSIUM PHOSPHATE, MONOBASIC POTASSIUM PHOSPHATE, DIBASIC 62.5 MILLIMOLE(S): 236; 224 INJECTION, SOLUTION INTRAVENOUS at 04:14

## 2018-09-25 RX ADMIN — Medication 100 MILLIGRAM(S): at 05:48

## 2018-09-25 RX ADMIN — OXYCODONE HYDROCHLORIDE 5 MILLIGRAM(S): 5 TABLET ORAL at 18:08

## 2018-09-25 RX ADMIN — OXYCODONE HYDROCHLORIDE 5 MILLIGRAM(S): 5 TABLET ORAL at 22:15

## 2018-09-25 RX ADMIN — Medication 5 MILLIGRAM(S): at 18:08

## 2018-09-25 RX ADMIN — OXYCODONE HYDROCHLORIDE 5 MILLIGRAM(S): 5 TABLET ORAL at 05:48

## 2018-09-25 RX ADMIN — Medication 100 MILLIGRAM(S): at 22:16

## 2018-09-25 RX ADMIN — OXYCODONE HYDROCHLORIDE 5 MILLIGRAM(S): 5 TABLET ORAL at 22:45

## 2018-09-25 RX ADMIN — OXYCODONE HYDROCHLORIDE 5 MILLIGRAM(S): 5 TABLET ORAL at 06:18

## 2018-09-25 RX ADMIN — CHLORHEXIDINE GLUCONATE 1 APPLICATION(S): 213 SOLUTION TOPICAL at 06:57

## 2018-09-25 RX ADMIN — Medication 100 MILLIGRAM(S): at 13:54

## 2018-09-25 RX ADMIN — ENOXAPARIN SODIUM 40 MILLIGRAM(S): 100 INJECTION SUBCUTANEOUS at 18:44

## 2018-09-25 RX ADMIN — OXYCODONE HYDROCHLORIDE 5 MILLIGRAM(S): 5 TABLET ORAL at 18:25

## 2018-09-25 RX ADMIN — SENNA PLUS 2 TABLET(S): 8.6 TABLET ORAL at 22:16

## 2018-09-25 RX ADMIN — Medication 100 MILLIGRAM(S): at 05:47

## 2018-09-25 RX ADMIN — DULOXETINE HYDROCHLORIDE 60 MILLIGRAM(S): 30 CAPSULE, DELAYED RELEASE ORAL at 13:33

## 2018-09-25 NOTE — PHYSICAL THERAPY INITIAL EVALUATION ADULT - PERTINENT HX OF CURRENT PROBLEM, REHAB EVAL
Pt is a 62 y.o. male who presents to the hospital due to falls with worsening gait, requiring a cane 1 wk ago & 3 days ago he necessitated a walker & yesterday was unable to ambulate stating his legs were giving out on him, pt also reports 3 days hx of difficulty controlling his urine, Urgency and inability to get to the bathroom before urinating.

## 2018-09-25 NOTE — PHYSICAL THERAPY INITIAL EVALUATION ADULT - GENERAL OBSERVATIONS, REHAB EVAL
Pt received semisupine in bed with +cardiac monitor, +BP cuff, +pulse ox, +IV and +ICU monitoring. NAD noted.

## 2018-09-25 NOTE — PROGRESS NOTE ADULT - SUBJECTIVE AND OBJECTIVE BOX
SICU PROGRESS NOTE  ================================================  Overnight events: patient complaining of back pain fairly well controlled with pain medication.    HPI:  62y Male with HTN, HLD who presented with complaint of falls with worsening gait, requiring a cane 1 week ago and 3 days prior to arrival he necessitated a walker and 1 day prior to arrival was unable to ambulate, stating his legs were giving out on him. Patient also reported 3 days history of difficulty controlling his urine, urgency, and inability to get to the bathroom before urinating. Patient was diagnosed with thoracic compression fracture over the summer and had seen Dr. Landon, had scheduled appointment to see him this   coming  but came to the ED because symptoms had become so severe. Wife at bedside, denies changes in bowel, states that pain radiates down his left leg and he has intermittent numbness of the LLE. Patient is status post L4-S1 PSF in  at [facility].    Patient had been having back pain since March and was initially treated for back spasms. He had an MRI in July that showed compression fracture of T12 as well as quad lesions in T10, 11 and 12. On current admission, repeat MRI shows progressive deterioration/moth eaten appearance of T9 with epidural tumor extension and severe cord compression with cord edema. Pt was taken to the OR emergently with orthospine on 18 and is now s/p thoracic laminectomy with T6-11 posterior spinal fusion. Case was about 4 hours, EBL 300ml, IVF 2600ml, UOP 70ml. Pt was extubate at end of case and transferred to SICU for q1 hour neurovascular checks and maintenance of MAP >75.      Objective:  Vital Signs  ICU Vital Signs Last 24 Hrs  T(C): 37.1 (25 Sep 2018 03:00), Max: 37.1 (25 Sep 2018 03:00)  T(F): 98.7 (25 Sep 2018 03:00), Max: 98.7 (25 Sep 2018 03:00)  HR: 74 (25 Sep 2018 03:00) (64 - 90)  BP: --  BP(mean): --  ABP: 111/50 (25 Sep 2018 03:00) (98/45 - 146/68)  ABP(mean): 68 (25 Sep 2018 03:00) (60 - 96)  RR: 15 (25 Sep 2018 03:00) (10 - 33)  SpO2: 94% (25 Sep 2018 03:00) (91% - 99%)    I&O's Detail    23 Sep 2018 07:01  -  24 Sep 2018 07:00  --------------------------------------------------------  IN:    IV PiggyBack: 100 mL    lactated ringers.: 150 mL    Oral Fluid: 1040 mL  Total IN: 1290 mL    OUT:    Bulb: 50 mL    Bulb: 20 mL    Bulb: 300 mL    Indwelling Catheter - Urethral: 1780 mL    Voided: 300 mL  Total OUT: 2450 mL    Total NET: -1160 mL      24 Sep 2018 07:01  -  25 Sep 2018 03:34  --------------------------------------------------------  IN:    IV PiggyBack: 50 mL  Total IN: 50 mL    OUT:    Bulb: 220 mL    Bulb: 20 mL    Indwelling Catheter - Urethral: 2600 mL  Total OUT: 2840 mL    Total NET: -2790 mL    Diet: Diet, Regular (18 @ 18:37)      MEDICATIONS  (STANDING):  ceFAZolin   IVPB 2000 milliGRAM(s) IV Intermittent every 8 hours  docusate sodium 100 milliGRAM(s) Oral three times a day  DULoxetine 60 milliGRAM(s) Oral daily  enoxaparin Injectable 40 milliGRAM(s) SubCutaneous every 24 hours  influenza   Vaccine 0.5 milliLiter(s) IntraMuscular once  potassium phosphate IVPB 15 milliMole(s) IV Intermittent once  senna 2 Tablet(s) Oral at bedtime    MEDICATIONS  (PRN):  acetaminophen   Tablet .. 975 milliGRAM(s) Oral every 6 hours PRN Mild Pain (1 - 3)  oxyCODONE    IR 5 milliGRAM(s) Oral every 4 hours PRN Moderate Pain (4 - 6)  traMADol 50 milliGRAM(s) Oral every 6 hours PRN Severe Pain (7 - 10)      PHYSICAL EXAM:  GEN: NAD, resting quietly, intubated, sedated  NEURO: CN II-XII grossly intact, no focal deficits  PULM: symmetric chest rise bilaterally, no increased WOB  CV: regular rate, peripheral pulses intact  ABD: soft, NTND; ABDIAS drain x2 to bulb suction with serosanguinous drainage  EXTR: no cyanosis or edema, moving all extremities    LABS  CBC ( @ 02:38)                              12.3<L>                         9.4     )----------------(  188        --    % Neutrophils, --    % Lymphocytes, ANC: --                                  34.8<L>              CBC ( @ 02:35)                              13.5                           15.5<H>  )----------------(  257        --    % Neutrophils, --    % Lymphocytes, ANC: --                                  37.9<L>                BMP ( @ 02:38)             136     |  103     |  19    		Ca++ --      Ca 9.2                ---------------------------------( 108<H>		Mg 2.0                3.9     |  26      |  0.89  			Ph 2.7     BMP ( @ 02:35)             135     |  101     |  14    		Ca++ 1.29    Ca 9.4                ---------------------------------( 135<H>		Mg 2.1                4.0     |  23      |  0.83  			Ph 4.6<H>      Coags ( @ 02:35)  aPTT 31.7 / INR 1.15 / PT 12.5  Coags ( @ 19:08)  aPTT 56.9<H> / INR 1.14 / PT 12.4    ABG ( @ 19:05)     7.36 / 44 / 114<H> / 24 / -1.2 / 98<H>%     Lactate:     ABG ( @ 17:03)     7.42 / 32 / 262<H> / 20<L> / -2.7<L> / 100<H>%     Lactate:       CULTURES  none    IMAGIN/24 CXR  IMPRESSION:   Clear lungs.

## 2018-09-25 NOTE — PHYSICAL THERAPY INITIAL EVALUATION ADULT - PRECAUTIONS/LIMITATIONS, REHAB EVAL
Pt was diagnosed with thoracic compression fracture a month ago and had seen Dr. Landon, had scheduled appointment to see him this coming Thurs but came to the ED because symptoms had become so severe. (-) CXR. (-) C-spine x-ray. (-) L-spine x-ray. +MR t-spine 9/23/18: Pathologic metastatic fracture deformity of T9 with loss of 80% of vertebral height and retropulsion into spinal canal by 6 mm with extraosseous tumoral prevertebral and epidural involvement compressing the thoracic cord which demonstrates hyperintense T2 and STIR signal concerning for cord edema and/or myelomalacia with a small amount of CSF along the dorsal margin of the spinal canal. 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 Decreased T1 and hyperintense STIR signal within the sacrum involving the posterior elements at the S2 level concerning for osseous metastasis. Postoperative fixation screws in the left  L4 vertebra and sacrum, bone grafts L4-5 and L5-S1 level, only noncontrast sagittal T1 and STIR images. +T-spine x-ray 9/22/18: There is an severe compression deformity of the T9 vertebral body, which appears acute.+T-spine CT 9/23/18: Metastatic pathologic compression fracture deformity of the T9 vertebra with 3 column involvement, loss of 80% of height epidural and prevertebral extension, and extension to the bilateral T8-9 and T9-10 neural foramina./fall precautions/spinal precautions

## 2018-09-25 NOTE — CONSULT NOTE ADULT - SUBJECTIVE AND OBJECTIVE BOX
Pt is a 62y admitted with complains of falls with worsening gait, requiring a cane 1 week ago and 3 days ago he necessitated a walker and yesterday was unable to ambulate stating his legs were giving out on him, he reported 3 days history of difficulty controlling his urine, Urgency and inability to get to the bathroom before urinating. Patient was diagnosed with thoracic compression fracture a month ago and had seen Dr. Landon, and had w/u at our office which did not show any paraproteinemia  Patient is status post L4-S1 PSF in 2003 at [facility]                           PAST MEDICAL & SURGICAL HISTORY:  BPH (benign prostatic hyperplasia): s/p TURP not on Flomax  Myopathy  Hypercholesteremia  Hypertension  Lumbar spinal stenosis: s/p L4-S1 PSF 2003  S/P repair of hydrocele: 2008  Hemorrhoids, unspecified hemorrhoid type  Disorder of thymus gland: s/p removal 1992  S/P TURP (status post transurethral resection of prostate): october 2017    Meds:  acetaminophen   Tablet .. 975 milliGRAM(s) Oral every 6 hours PRN Mild Pain (1 - 3)  ceFAZolin   IVPB 2000 milliGRAM(s) IV Intermittent every 8 hours  chlorhexidine 4% Liquid 1 Application(s) Topical <User Schedule>  diazepam    Tablet 5 milliGRAM(s) Oral two times a day PRN back spasms  docusate sodium 100 milliGRAM(s) Oral three times a day  DULoxetine 60 milliGRAM(s) Oral daily  enoxaparin Injectable 40 milliGRAM(s) SubCutaneous every 24 hours  influenza   Vaccine 0.5 milliLiter(s) IntraMuscular once  oxyCODONE    IR 5 milliGRAM(s) Oral every 4 hours PRN Moderate Pain (4 - 6)  senna 2 Tablet(s) Oral at bedtime  traMADol 50 milliGRAM(s) Oral every 6 hours PRN Severe Pain (7 - 10)    Labs:  CBC Full  -  ( 25 Sep 2018 09:27 )  WBC Count : 9.0 K/uL  Hemoglobin : 11.9 g/dL  Hematocrit : 33.1 %  Platelet Count - Automated : 181 K/uL  Mean Cell Volume : 93.4 fl  Mean Cell Hemoglobin : 33.5 pg  Mean Cell Hemoglobin Concentration : 35.9 gm/dL  Auto Neutrophil # : x  Auto Lymphocyte # : x  Auto Monocyte # : x  Auto Eosinophil # : x  Auto Basophil # : x  Auto Neutrophil % : x  Auto Lymphocyte % : x  Auto Monocyte % : x  Auto Eosinophil % : x  Auto Basophil % : x    09-25    136  |  103  |  19  ----------------------------<  108<H>  3.9   |  26  |  0.89    Ca    9.2      25 Sep 2018 02:38  Phos  2.7     09-25  Mg     2.0     09-25        Radiology:             ROS:  Comfortable in SICU. Says legs strength is improving. Will know about urine control once catheter is out    Vital Signs Last 24 Hrs  T(C): 37 (25 Sep 2018 11:00), Max: 37.1 (25 Sep 2018 03:00)  T(F): 98.6 (25 Sep 2018 11:00), Max: 98.8 (25 Sep 2018 07:00)  HR: 75 (25 Sep 2018 13:00) (69 - 91)  BP: --  BP(mean): --  RR: 15 (25 Sep 2018 13:00) (11 - 28)  SpO2: 93% (25 Sep 2018 13:00) (91% - 98%)    Physical Exam:  Patient comfortable  AXOX3  Neck supple, no LN's  Chest: bilateral breath sounds, no wheeze or rales  CVS: regular heart rate without murmur  Abdomen: soft, BS+, no massess or organomegaly  CNS: Can move legs against gravity  Ext: no edema

## 2018-09-25 NOTE — CHART NOTE - NSCHARTNOTEFT_GEN_A_CORE
Evaluate and measure for custom TLSO. Measurements taken supine without incident. Orthosis to be fit and delivered 9/26/18.  Crow PIMENTEL  [place] [facility]  [phone]

## 2018-09-25 NOTE — PROGRESS NOTE ADULT - SUBJECTIVE AND OBJECTIVE BOX
Orthopaedic Surgery Progress Note    Subjective:   Patient seen and examined  No acute events overnight  Having some pain this AM  Denies numbness/tingling    Objective:  T(C): 37.1 (09-25-18 @ 03:00), Max: 37.1 (09-25-18 @ 03:00)  HR: 71 (09-25-18 @ 05:00) (65 - 90)  RR: 20 (09-25-18 @ 05:00) (10 - 33)  SpO2: 95% (09-25-18 @ 05:00) (91% - 99%)    09-23 @ 07:01  -  09-24 @ 07:00  --------------------------------------------------------  IN: 1290 mL / OUT: 2450 mL / NET: -1160 mL    09-24 @ 07:01  -  09-25 @ 06:23  --------------------------------------------------------  IN: 112.5 mL / OUT: 2915 mL / NET: -2802.5 mL    PE  NAD  Back:   dressing C/D/I, mild appropriate marija-incisional ttp  ABDIAS x2 in place w/ serosanguinous output  Neuro:  RLE IP 5/5 HS 5/5 Q 5/5 GS 5/5 TA 5/5 EHL 5/5   SILT L2-S1  LLE IP 5/5 HS 5/5 Q 5/5 GS 5/5 TA 5/5 EHL 5/5  SILT L2-S1  WWP BLE  [place] in place                        12.3   9.4   )-----------( 188      ( 25 Sep 2018 02:38 )             34.8     09-25    136  |  103  |  19  ----------------------------<  108<H>  3.9   |  26  |  0.89    Ca    9.2      25 Sep 2018 02:38  Phos  2.7     09-25  Mg     2.0     09-25    PT/INR - ( 24 Sep 2018 02:35 )   PT: 12.5 sec;   INR: 1.15 ratio       PTT - ( 24 Sep 2018 02:35 )  PTT:31.7 sec    62y Male POD2 s/p T7-T10 laminectomy, T6-T11 PSF for T9 pathologic fracture, doing well post-operatively    - Pain control  - FU labs  - WBAT  - PT/OT/OOB  - I/S  - Monitor drain output  - Ancef while drains in  - SCDs start lovenox 40 qD  - Needs TLSO brace  - FU path  - FU medicine (Dr. Veliz) and oncology (dr. De Anda) recs  - Care per [facility]    Abe Foster MD

## 2018-09-25 NOTE — PHYSICAL THERAPY INITIAL EVALUATION ADULT - ADDITIONAL COMMENTS
Pt states he lives with his wife, son, daughter and in laws in a house with 2 steps then 4 steps to enter, +HR & 3-4 steps inside, +HR. Pt states he is independent with all ADLs and ambulation. Pt does not use a AD for ambulation but owns RW and cane. Pt wears reading glasses and drives. Pt states his family is available to assist when needed.

## 2018-09-25 NOTE — PHYSICAL THERAPY INITIAL EVALUATION ADULT - DISCHARGE DISPOSITION, PT EVAL
Progressing towards home with assistance for all functional mobility, RW for gait (pt owns) and home PT services for general strengthening, fall prevention, balance training, safety assessment, and to restore pt's prior level of function. Pt states his wife and son are available to assist when needed.

## 2018-09-25 NOTE — CONSULT NOTE ADULT - ASSESSMENT
Patient had back pain since March 2018. Previous MRI had T9 compression fracture with adjacent soft tissue swelling. On current admission, repeat MRI showed progressive deterioration/moth eaten appearance of T9 with epidural tumor extension and severe cord compression with cord edema. Pt was taken to the OR emergently with orthospine on 9/23/18 and is now s/p thoracic laminectomy with T6-11 posterior spinal fusion.  He is recovering well from surgery and says legs are better than before. He still has urine catheter so does know about urine control.  Workup in office had not shown paraproteinemia.  Patient was explained that we have to wait for pathology to plan further management.   In the mean time management as per SICU will continue

## 2018-09-25 NOTE — PHYSICAL THERAPY INITIAL EVALUATION ADULT - PLANNED THERAPY INTERVENTIONS, PT EVAL
balance training/gait training/GOAL: Stair Negotiation Training: Patient will be able to negotiate up & down 1 flight of stairs with bilateral rails, step to gait pattern, in 2 weeks./transfer training/bed mobility training/strengthening

## 2018-09-26 LAB
ANION GAP SERPL CALC-SCNC: 8 MMOL/L — SIGNIFICANT CHANGE UP (ref 5–17)
APPEARANCE UR: ABNORMAL
BACTERIA # UR AUTO: NEGATIVE — SIGNIFICANT CHANGE UP
BILIRUB UR-MCNC: NEGATIVE — SIGNIFICANT CHANGE UP
BUN SERPL-MCNC: 13 MG/DL — SIGNIFICANT CHANGE UP (ref 7–23)
CALCIUM SERPL-MCNC: 9.7 MG/DL — SIGNIFICANT CHANGE UP (ref 8.4–10.5)
CHLORIDE SERPL-SCNC: 99 MMOL/L — SIGNIFICANT CHANGE UP (ref 96–108)
CO2 SERPL-SCNC: 27 MMOL/L — SIGNIFICANT CHANGE UP (ref 22–31)
COLOR SPEC: YELLOW — SIGNIFICANT CHANGE UP
CREAT SERPL-MCNC: 0.86 MG/DL — SIGNIFICANT CHANGE UP (ref 0.5–1.3)
DIFF PNL FLD: ABNORMAL
EPI CELLS # UR: 0 /HPF — SIGNIFICANT CHANGE UP
GLUCOSE SERPL-MCNC: 112 MG/DL — HIGH (ref 70–99)
GLUCOSE UR QL: NEGATIVE — SIGNIFICANT CHANGE UP
HCT VFR BLD CALC: 38.9 % — LOW (ref 39–50)
HGB BLD-MCNC: 13.6 G/DL — SIGNIFICANT CHANGE UP (ref 13–17)
HYALINE CASTS # UR AUTO: 1 /LPF — SIGNIFICANT CHANGE UP (ref 0–2)
KETONES UR-MCNC: NEGATIVE — SIGNIFICANT CHANGE UP
LEUKOCYTE ESTERASE UR-ACNC: NEGATIVE — SIGNIFICANT CHANGE UP
MAGNESIUM SERPL-MCNC: 2.1 MG/DL — SIGNIFICANT CHANGE UP (ref 1.6–2.6)
MCHC RBC-ENTMCNC: 32.9 PG — SIGNIFICANT CHANGE UP (ref 27–34)
MCHC RBC-ENTMCNC: 35 GM/DL — SIGNIFICANT CHANGE UP (ref 32–36)
MCV RBC AUTO: 93.8 FL — SIGNIFICANT CHANGE UP (ref 80–100)
NITRITE UR-MCNC: NEGATIVE — SIGNIFICANT CHANGE UP
PH UR: 6 — SIGNIFICANT CHANGE UP (ref 5–8)
PHOSPHATE SERPL-MCNC: 3.3 MG/DL — SIGNIFICANT CHANGE UP (ref 2.5–4.5)
PLATELET # BLD AUTO: 224 K/UL — SIGNIFICANT CHANGE UP (ref 150–400)
POTASSIUM SERPL-MCNC: 4 MMOL/L — SIGNIFICANT CHANGE UP (ref 3.5–5.3)
POTASSIUM SERPL-SCNC: 4 MMOL/L — SIGNIFICANT CHANGE UP (ref 3.5–5.3)
PROT UR-MCNC: ABNORMAL
RBC # BLD: 4.15 M/UL — LOW (ref 4.2–5.8)
RBC # FLD: 12.7 % — SIGNIFICANT CHANGE UP (ref 10.3–14.5)
RBC CASTS # UR COMP ASSIST: 127 /HPF — HIGH (ref 0–4)
SODIUM SERPL-SCNC: 134 MMOL/L — LOW (ref 135–145)
SP GR SPEC: 1.02 — SIGNIFICANT CHANGE UP (ref 1.01–1.02)
SURGICAL PATHOLOGY STUDY: SIGNIFICANT CHANGE UP
UROBILINOGEN FLD QL: NEGATIVE — SIGNIFICANT CHANGE UP
WBC # BLD: 10.2 K/UL — SIGNIFICANT CHANGE UP (ref 3.8–10.5)
WBC # FLD AUTO: 10.2 K/UL — SIGNIFICANT CHANGE UP (ref 3.8–10.5)
WBC UR QL: 21 /HPF — HIGH (ref 0–5)

## 2018-09-26 PROCEDURE — 99232 SBSQ HOSP IP/OBS MODERATE 35: CPT | Mod: GC

## 2018-09-26 RX ADMIN — Medication 975 MILLIGRAM(S): at 11:09

## 2018-09-26 RX ADMIN — DULOXETINE HYDROCHLORIDE 60 MILLIGRAM(S): 30 CAPSULE, DELAYED RELEASE ORAL at 11:09

## 2018-09-26 RX ADMIN — Medication 975 MILLIGRAM(S): at 05:50

## 2018-09-26 RX ADMIN — Medication 100 MILLIGRAM(S): at 05:04

## 2018-09-26 RX ADMIN — OXYCODONE HYDROCHLORIDE 5 MILLIGRAM(S): 5 TABLET ORAL at 05:50

## 2018-09-26 RX ADMIN — Medication 100 MILLIGRAM(S): at 15:05

## 2018-09-26 RX ADMIN — Medication 975 MILLIGRAM(S): at 11:40

## 2018-09-26 RX ADMIN — OXYCODONE HYDROCHLORIDE 5 MILLIGRAM(S): 5 TABLET ORAL at 06:21

## 2018-09-26 RX ADMIN — OXYCODONE HYDROCHLORIDE 5 MILLIGRAM(S): 5 TABLET ORAL at 11:40

## 2018-09-26 RX ADMIN — Medication 100 MILLIGRAM(S): at 21:52

## 2018-09-26 RX ADMIN — Medication 5 MILLIGRAM(S): at 02:39

## 2018-09-26 RX ADMIN — Medication 5 MILLIGRAM(S): at 21:52

## 2018-09-26 RX ADMIN — CHLORHEXIDINE GLUCONATE 1 APPLICATION(S): 213 SOLUTION TOPICAL at 05:04

## 2018-09-26 RX ADMIN — Medication 975 MILLIGRAM(S): at 06:20

## 2018-09-26 RX ADMIN — OXYCODONE HYDROCHLORIDE 5 MILLIGRAM(S): 5 TABLET ORAL at 11:09

## 2018-09-26 RX ADMIN — OXYCODONE HYDROCHLORIDE 5 MILLIGRAM(S): 5 TABLET ORAL at 19:00

## 2018-09-26 RX ADMIN — ENOXAPARIN SODIUM 40 MILLIGRAM(S): 100 INJECTION SUBCUTANEOUS at 17:01

## 2018-09-26 RX ADMIN — OXYCODONE HYDROCHLORIDE 5 MILLIGRAM(S): 5 TABLET ORAL at 18:32

## 2018-09-26 NOTE — PROGRESS NOTE ADULT - SUBJECTIVE AND OBJECTIVE BOX
Orthopaedic Surgery Progress Note    Subjective:   Patient seen and examined  No acute events overnight  Having some pain this AM  Denies numbness/tingling    ICU Vital Signs Last 24 Hrs  T(C): 36.9 (26 Sep 2018 03:00), Max: 37.3 (25 Sep 2018 23:00)  T(F): 98.4 (26 Sep 2018 03:00), Max: 99.1 (25 Sep 2018 23:00)  HR: 69 (26 Sep 2018 03:00) (69 - 91)  BP: 140/84 (26 Sep 2018 03:00) (102/62 - 140/84)  BP(mean): 107 (26 Sep 2018 03:00) (77 - 107)  ABP: 116/58 (25 Sep 2018 13:00) (100/52 - 125/62)  ABP(mean): 75 (25 Sep 2018 13:00) (67 - 82)  RR: 23 (26 Sep 2018 03:00) (15 - 28)  SpO2: 95% (26 Sep 2018 03:00) (91% - 98%)    PE  NAD  Back:   dressing C/D/I, mild appropriate marija-incisional ttp  ABDIAS x2 in place w/ serosanguinous output  Neuro:  RLE IP 5/5 HS 5/5 Q 5/5 GS 5/5 TA 5/5 EHL 5/5   SILT L2-S1  LLE IP 5/5 HS 5/5 Q 5/5 GS 5/5 TA 5/5 EHL 5/5  SILT L2-S1  WWP BLE               A/PL s/p T7-T10 laminectomy, T6-T11 PSF for T9 pathologic fracture, doing well post-operatively    - Pain control  - FU labs  - WBAT  - PT/OT/OOB  - I/S  - Monitor drain output  - Ancef while drains in  - SCDs start lovenox 40 qD  - Needs TLSO brace  - FU path  - FU medicine (Dr. Veliz) and oncology (dr. De Anda) recs  - Care per SICU  FU transfer to floor 9/26

## 2018-09-26 NOTE — PROGRESS NOTE ADULT - SUBJECTIVE AND OBJECTIVE BOX
HPI:  CHIEF COMPLAINT:     HPI: Pt is a 62y complains of falls with worsening gait, requiring a cane 1 week ago and 3 days ago he necessitated a walker and yesterday was unable to ambulate stating his legs were giving out on him, patient also reports 3 days history of difficulty controlling his urine, Urgency and inability to get to the bathroom before urinating. Patient was diagnosed with thoracic compression fracture a month ago and had seen Dr. Landon, had scheduled appointment to see him this coming Thursday but came to the ED because symptoms had become so severe. Wife at bedside, denies changes in bowel, States that pain radiates down his left leg and he has intermittent numbness of the LLE.    Patient is status post L4-S1 PSF in 2003 at \[facility]\""      PAST MEDICAL & SURGICAL HISTORY:  Myopathy  Hypercholesteremia  Hypertension    Vital Signs Last 24 Hrs  T(C): 37 (22 Sep 2018 21:26), Max: 37 (22 Sep 2018 21:26)  T(F): 98.6 (22 Sep 2018 21:26), Max: 98.6 (22 Sep 2018 21:26)  HR: 68 (22 Sep 2018 21:26) (68 - 85)  BP: 145/89 (22 Sep 2018 21:26) (128/85 - 145/89)  BP(mean): --  RR: 16 (22 Sep 2018 21:26) (16 - 20)  SpO2: 98% (22 Sep 2018 21:26) (97% - 98%)    I&O's Detail  LABS:                      14.8   7.1   )-----------( 244      ( 22 Sep 2018 22:52 )             40.4     09-22  140  |  100  |  14  ----------------------------<  95  4.1   |  28  |  1.00    Ca    10.4      22 Sep 2018 22:52  TPro  7.1  /  Alb  4.0  /  TBili  0.7  /  DBili  x   /  AST  22  /  ALT  20  /  AlkPhos  64  09-22  PT/INR - ( 22 Sep 2018 22:52 )   PT: 11.9 sec;   INR: 1.09 ratio    PTT - ( 22 Sep 2018 22:52 )  PTT:34.6 sec    RADIOLOGY & ADDITIONAL STUDIES:    PHYSICAL EXAM:  General; Awake and alert, Oriented x 3  Hips/Pelvis:   ABle to SLR bilaterally. Negative log roll, heel strike. No pain on passive Int/Ext hip rotation.  Spine exam:  Neck: supple, NT, Full Painless baseline AROM  Back: TTP Thoracic spine  Spine:                       Motor exam:          Right Upper extremity: Delt 5/5, Biceps 5/5, Triceps 5/5, Wrist Ext 5/5, Wrist Flexion 5/5,  Strength 5/5         SILT C5-S1, No Clonus, Negative Chung, +RP, Compartments soft           Left Upper extremity: Delt 5/5, Biceps 5/5, Triceps 5/5, Wrist Ext 5/5, Wrist Flexion 5/5,  Strength 5/5         SILT C5-S1, No Clonus, Negative Chung, +RP, Compartments soft           Right Lower Extremity: Hyperreflexive patella tendon reflex 3+, Hip Flexion 4/5, Hip Extension 4/5, Knee Flexion 4/5, Knee Extension 4/5, Ankle Dorsiflexion 5/5, Ankle Plantar Flexion 5/5, Extensor hallucis Longus 4/5         SILT L2-S1, No pain with SLR, Negative Clonus, Negative Babinski                  Left Lower Extremity: Hyperreflexive patella tendon reflex 3+, Hip Flexion 4/5, Hip Extension 4/5, Knee Flexion 4/5, Knee Extension 4/5, Ankle Dorsiflexion 5/5,  Ankle Plantar Flexion 5/5, Extensor hallucis Longus 4/5         Decreased sensation compared with contralateral side L3/4/5,  No pain with SLR, Negative Clonus, Negative Babinski (23 Sep 2018 00:50)    PAST MEDICAL & SURGICAL HISTORY:  BPH (benign prostatic hyperplasia): s/p TURP not on Flomax  Myopathy  Hypercholesteremia  Hypertension  Lumbar spinal stenosis: s/p L4-S1 PSF 2003  S/P repair of hydrocele: 2008  Hemorrhoids, unspecified hemorrhoid type  Disorder of thymus gland: s/p removal 1992  S/P TURP (status post transurethral resection of prostate): october 2017    Medications:  acetaminophen   Tablet .. 975 milliGRAM(s) Oral every 6 hours PRN Mild Pain (1 - 3)  ceFAZolin   IVPB 2000 milliGRAM(s) IV Intermittent every 8 hours  chlorhexidine 4% Liquid 1 Application(s) Topical <User Schedule>  diazepam    Tablet 5 milliGRAM(s) Oral two times a day PRN back spasms  docusate sodium 100 milliGRAM(s) Oral three times a day  DULoxetine 60 milliGRAM(s) Oral daily  enoxaparin Injectable 40 milliGRAM(s) SubCutaneous every 24 hours  influenza   Vaccine 0.5 milliLiter(s) IntraMuscular once  oxyCODONE    IR 5 milliGRAM(s) Oral every 4 hours PRN Moderate Pain (4 - 6)  senna 2 Tablet(s) Oral at bedtime  traMADol 50 milliGRAM(s) Oral every 6 hours PRN Severe Pain (7 - 10)    Labs:  CBC Full  -  ( 26 Sep 2018 03:29 )  WBC Count : 10.2 K/uL  Hemoglobin : 13.6 g/dL  Hematocrit : 38.9 %  Platelet Count - Automated : 224 K/uL  Mean Cell Volume : 93.8 fl  Mean Cell Hemoglobin : 32.9 pg  Mean Cell Hemoglobin Concentration : 35.0 gm/dL  Auto Neutrophil # : x  Auto Lymphocyte # : x  Auto Monocyte # : x  Auto Eosinophil # : x  Auto Basophil # : x  Auto Neutrophil % : x  Auto Lymphocyte % : x  Auto Monocyte % : x  Auto Eosinophil % : x  Auto Basophil % : x    09-26    134<L>  |  99  |  13  ----------------------------<  112<H>  4.0   |  27  |  0.86    Ca    9.7      26 Sep 2018 03:29  Phos  3.3     09-26  Mg     2.1     09-26  Flow Cytometry Order. (09.23.18 @ 20:20)    TM Interpretation:   Flow Cytometry Final Report  ________________________________________________________________________  Specimen: Epidural tumor  Collected: 09/23/2018 11:00  Received: 09/23/2018 20:20  Processed: 09/24/2018 11:00  Reported: 09/25/2018 15:20  Accession #: [id number]  FL -[id number]  ________________________________________________________________________  CLINICAL DATA: Rule out lymphoma    ________________________________________________________________________  DIAGNOSIS:  Epidural tumor:       - The lymphocyte immunophenotypic findings show small population of larger B-cells (1% of  cells), positive for dim lambda, CD19, bright CD20; negative CD10; small lymphocytes show no  diagnostic abnormalities. Please see interpretation.    INTERPRETATION:  MORPHOLOGY: Touch preparation shows increased large cells, admixed with small lymphocytes.    IMMUNOPHENOTYPE:  Lymphocytes (67% of cells): Heterogeneous population of T-cells (with normal CD4  to CD8 ratio), and polytypic B-cells (3% ofcells). Small population of larger B-cells (1% of  cells), positive for dim lambda, CD19, bright CD20; negative CD10.    The lymphocyte immunophenotypic findings show small population of larger B-cells (1% of cells),  positive for dim lambda, CD19, bright CD20; negative CD10; small lymphocytes show no diagnostic  abnormalities.  .    The findings are suspicious for large B-cell lymphoma. Correlation with clinical findings and/or  histology is necessary.  _____________________________________________________________________  Viability ................. 83.1%    Values reported are based on the lymphocyte gate. (Bright CD45 positive; low side scatter, low  forward scatter).  67% of cells.  CD45 .......... 100 %  CD3 ........... 91 %  CD5 ........... 90 %  CD7 ........... 84 %  CD4 ........... 47 %  CD8 ........... 39 %  CD10 .......... 0 %  CD19 .......... 5 %  CD20 .......... 4 %  kappa ......... 2 %  lambda ........ 2 %            Verified By: Audi Jeffrey M.D., M.D.  (Electronic Signature)    This test was developed and its performance characteristics determined by the Flow Cytometry  Laboratory at [facility]. It has not been cleared or approved by the U.S. Food and Drug  Administration.  The FDA has determined that such clearance or approval is not necessary. This test is used for  clinical purposes. It should not be regarded as investigational or for research. This laboratory is  certified under the Clinical Laboratory Improvement Amendment of 1988 ("CLIA") as qualified to  perform high complexity clinical testing.          Radiology:             ROS:  Patient comfortable without distress  No SOB or chest pain  No palpitation  No abdominal pain, diarrhaea or constipation  No weakness of extremities  No skin changes or swelling of legs    Vital Signs Last 24 Hrs  T(C): 36.9 (26 Sep 2018 07:00), Max: 37.3 (25 Sep 2018 23:00)  T(F): 98.4 (26 Sep 2018 07:00), Max: 99.1 (25 Sep 2018 23:00)  HR: 70 (26 Sep 2018 07:00) (69 - 91)  BP: 114/71 (26 Sep 2018 07:00) (102/62 - 140/84)  BP(mean): 86 (26 Sep 2018 07:00) (77 - 107)  RR: 20 (26 Sep 2018 07:00) (15 - 28)  SpO2: 98% (26 Sep 2018 07:00) (91% - 98%)    Physical exam:  Patient alert and oriented  No distress  CVS: S1, S2 regular or murmur  Chest: bilateral breath sound without rales  Abdomen: soft, not tender, no organomegaly or masses  No focal neuro deficit  No edema      Assessment and Plan: Pt is a 62y admitted with complains of falls with worsening gait, requiring a cane 1 week ago and 3 days ago he necessitated a walker before was unable to ambulate stating his legs were giving out on him, he reported 3 days history of difficulty controlling his urine, Urgency and inability to get to the bathroom before urinating. Patient was diagnosed with thoracic compression fracture a month ago and had seen Dr. Landon, and had w/u at our office which did not show any paraproteinemia  Patient is status post L4-S1 PSF in 2003 at [facility]                               PAST MEDICAL & SURGICAL HISTORY:  BPH (benign prostatic hyperplasia): s/p TURP not on Flomax  Myopathy  Hypercholesteremia  Hypertension  Lumbar spinal stenosis: s/p L4-S1 PSF 2003  S/P repair of hydrocele: 2008  Hemorrhoids, unspecified hemorrhoid type  Disorder of thymus gland: s/p removal 1992  S/P TURP (status post transurethral resection of prostate): october 2017    Medications:  acetaminophen   Tablet .. 975 milliGRAM(s) Oral every 6 hours PRN Mild Pain (1 - 3)  ceFAZolin   IVPB 2000 milliGRAM(s) IV Intermittent every 8 hours  chlorhexidine 4% Liquid 1 Application(s) Topical <User Schedule>  diazepam    Tablet 5 milliGRAM(s) Oral two times a day PRN back spasms  docusate sodium 100 milliGRAM(s) Oral three times a day  DULoxetine 60 milliGRAM(s) Oral daily  enoxaparin Injectable 40 milliGRAM(s) SubCutaneous every 24 hours  influenza   Vaccine 0.5 milliLiter(s) IntraMuscular once  oxyCODONE    IR 5 milliGRAM(s) Oral every 4 hours PRN Moderate Pain (4 - 6)  senna 2 Tablet(s) Oral at bedtime  traMADol 50 milliGRAM(s) Oral every 6 hours PRN Severe Pain (7 - 10)    Labs:  CBC Full  -  ( 26 Sep 2018 03:29 )  WBC Count : 10.2 K/uL  Hemoglobin : 13.6 g/dL  Hematocrit : 38.9 %  Platelet Count - Automated : 224 K/uL  Mean Cell Volume : 93.8 fl  Mean Cell Hemoglobin : 32.9 pg  Mean Cell Hemoglobin Concentration : 35.0 gm/dL  Auto Neutrophil # : x  Auto Lymphocyte # : x  Auto Monocyte # : x  Auto Eosinophil # : x  Auto Basophil # : x  Auto Neutrophil % : x  Auto Lymphocyte % : x  Auto Monocyte % : x  Auto Eosinophil % : x  Auto Basophil % : x    09-26    134<L>  |  99  |  13  ----------------------------<  112<H>  4.0   |  27  |  0.86    Ca    9.7      26 Sep 2018 03:29  Phos  3.3     09-26  Mg     2.1     09-26  Flow Cytometry Order. (09.23.18 @ 20:20)    TM Interpretation:   Flow Cytometry Final Report  ________________________________________________________________________  Specimen: Epidural tumor  Collected: 09/23/2018 11:00  Received: 09/23/2018 20:20  Processed: 09/24/2018 11:00  Reported: 09/25/2018 15:20  Accession #: [id number]  FL -[id number]  ________________________________________________________________________  CLINICAL DATA: Rule out lymphoma    ________________________________________________________________________  DIAGNOSIS:  Epidural tumor:       - The lymphocyte immunophenotypic findings show small population of larger B-cells (1% of  cells), positive for dim lambda, CD19, bright CD20; negative CD10; small lymphocytes show no  diagnostic abnormalities. Please see interpretation.    INTERPRETATION:  MORPHOLOGY: Touch preparation shows increased large cells, admixed with small lymphocytes.    IMMUNOPHENOTYPE:  Lymphocytes (67% of cells): Heterogeneous population of T-cells (with normal CD4  to CD8 ratio), and polytypic B-cells (3% ofcells). Small population of larger B-cells (1% of  cells), positive for dim lambda, CD19, bright CD20; negative CD10.    The lymphocyte immunophenotypic findings show small population of larger B-cells (1% of cells),  positive for dim lambda, CD19, bright CD20; negative CD10; small lymphocytes show no diagnostic  abnormalities.  .    The findings are suspicious for large B-cell lymphoma. Correlation with clinical findings and/or  histology is necessary.  _____________________________________________________________________  Viability ................. 83.1%    Values reported are based on the lymphocyte gate. (Bright CD45 positive; low side scatter, low  forward scatter).  67% of cells.  CD45 .......... 100 %  CD3 ........... 91 %  CD5 ........... 90 %  CD7 ........... 84 %  CD4 ........... 47 %  CD8 ........... 39 %  CD10 .......... 0 %  CD19 .......... 5 %  CD20 .......... 4 %  kappa ......... 2 %  lambda ........ 2 %            Verified By: Audi Jeffrey M.D., M.D.  (Electronic Signature)    This test was developed and its performance characteristics determined by the Flow Cytometry  Laboratory at [facility]. It has not been cleared or approved by the U.S. Food and Drug  Administration.  The FDA has determined that such clearance or approval is not necessary. This test is used for  clinical purposes. It should not be regarded as investigational or for research. This laboratory is  certified under the Clinical Laboratory Improvement Amendment of 1988 ("CLIA") as qualified to  perform high complexity clinical testing.          Radiology:             ROS:  Patient comfortable without distress  No SOB or chest pain  No palpitation  No abdominal pain, diarrhaea or constipation  Feels that strength of LE is improving  No skin changes or swelling of legs    Vital Signs Last 24 Hrs  T(C): 36.9 (26 Sep 2018 07:00), Max: 37.3 (25 Sep 2018 23:00)  T(F): 98.4 (26 Sep 2018 07:00), Max: 99.1 (25 Sep 2018 23:00)  HR: 70 (26 Sep 2018 07:00) (69 - 91)  BP: 114/71 (26 Sep 2018 07:00) (102/62 - 140/84)  BP(mean): 86 (26 Sep 2018 07:00) (77 - 107)  RR: 20 (26 Sep 2018 07:00) (15 - 28)  SpO2: 98% (26 Sep 2018 07:00) (91% - 98%)    Physical exam:  Patient alert and oriented  No distress  CVS: S1, S2 regular or murmur  Chest: bilateral breath sound without rales  Abdomen: soft, not tender, no organomegaly or masses  see notes of ICU, improving power of LE  No edema      Assessment and Plan:

## 2018-09-26 NOTE — PROGRESS NOTE ADULT - SUBJECTIVE AND OBJECTIVE BOX
SICU PROGRESS NOTE  ================================================  Overnight events: Restarted home valium prn for spasms. Patient listed for floor. Patient failed TOV - maria replaced.     HPI:  62y Male with HTN, HLD who presented with complaint of falls with worsening gait, requiring a cane 1 week ago and 3 days prior to arrival he necessitated a walker and 1 day prior to arrival was unable to ambulate, stating his legs were giving out on him. Patient also reported 3 days history of difficulty controlling his urine, urgency, and inability to get to the bathroom before urinating. Patient was diagnosed with thoracic compression fracture over the summer and had seen Dr. Landon, had scheduled appointment to see him this   coming Thursday 9/27 but came to the ED because symptoms had become so severe. Wife at bedside, denies changes in bowel, states that pain radiates down his left leg and he has intermittent numbness of the LLE. Patient is status post L4-S1 PSF in 2003 at [facility].    Patient had been having back pain since March and was initially treated for back spasms. He had an MRI in July that showed compression fracture of T12 as well as quad lesions in T10, 11 and 12. On current admission, repeat MRI shows progressive deterioration/moth eaten appearance of T9 with epidural tumor extension and severe cord compression with cord edema. Pt was taken to the OR emergently with orthospine on 9/23/18 and is now s/p thoracic laminectomy with T6-11 posterior spinal fusion. Case was about 4 hours, EBL 300ml, IVF 2600ml, UOP 70ml. Pt was extubate at end of case and transferred to SICU for q1 hour neurovascular checks and maintenance of MAP >75.    Vital Signs Last 24 Hrs  T(C): 37.3 (25 Sep 2018 23:00), Max: 37.3 (25 Sep 2018 23:00)  T(F): 99.1 (25 Sep 2018 23:00), Max: 99.1 (25 Sep 2018 23:00)  HR: 76 (25 Sep 2018 23:00) (69 - 91)  BP: 130/75 (25 Sep 2018 23:00) (102/62 - 130/75)  BP(mean): 97 (25 Sep 2018 23:00) (77 - 97)  RR: 21 (25 Sep 2018 23:00) (15 - 28)  SpO2: 92% (25 Sep 2018 23:00) (91% - 98%)    PHYSICAL EXAM:  GEN: NAD, resting quietly, intubated, sedated  NEURO: CN II-XII grossly intact, no focal deficits  PULM: symmetric chest rise bilaterally, no increased WOB  CV: regular rate, peripheral pulses intact  ABD: soft, NTND; ABDIAS drain x2 to bulb suction with serosanguinous drainage  EXTR: no cyanosis or edema, moving all extremities      LABS:                        11.9   9.0   )-----------( 181      ( 25 Sep 2018 09:27 )             33.1     09-25    136  |  103  |  19  ----------------------------<  108<H>  3.9   |  26  |  0.89    Ca    9.2      25 Sep 2018 02:38  Phos  2.7     09-25  Mg     2.0     09-25            INs and OUTs:    09-24-18 @ 07:01  -  09-25-18 @ 07:00  --------------------------------------------------------  IN: 287.5 mL / OUT: 3480 mL / NET: -3192.5 mL    09-25-18 @ 07:01  -  09-26-18 @ 03:04  --------------------------------------------------------  IN: 1350 mL / OUT: 500 mL / NET: 850 mL

## 2018-09-26 NOTE — CHART NOTE - NSCHARTNOTEFT_GEN_A_CORE
Fit and apply custom TLSO with physical therapy assist.. Orthosis fit well. Reviewed application skin precautions and care. Contact information given. To notify office with any issues questions or concerns.  Crow PIMENTEL  [facility]  [phone]

## 2018-09-27 DIAGNOSIS — C83.39 DIFFUSE LARGE B-CELL LYMPHOMA, EXTRANODAL AND SOLID ORGAN SITES: ICD-10-CM

## 2018-09-27 DIAGNOSIS — G95.20 UNSPECIFIED CORD COMPRESSION: ICD-10-CM

## 2018-09-27 LAB
ANION GAP SERPL CALC-SCNC: 9 MMOL/L — SIGNIFICANT CHANGE UP (ref 5–17)
BUN SERPL-MCNC: 12 MG/DL — SIGNIFICANT CHANGE UP (ref 7–23)
CALCIUM SERPL-MCNC: 9.3 MG/DL — SIGNIFICANT CHANGE UP (ref 8.4–10.5)
CHLORIDE SERPL-SCNC: 100 MMOL/L — SIGNIFICANT CHANGE UP (ref 96–108)
CO2 SERPL-SCNC: 30 MMOL/L — SIGNIFICANT CHANGE UP (ref 22–31)
CREAT SERPL-MCNC: 0.88 MG/DL — SIGNIFICANT CHANGE UP (ref 0.5–1.3)
GLUCOSE SERPL-MCNC: 87 MG/DL — SIGNIFICANT CHANGE UP (ref 70–99)
HCT VFR BLD CALC: 35.2 % — LOW (ref 39–50)
HGB BLD-MCNC: 12.4 G/DL — LOW (ref 13–17)
MAGNESIUM SERPL-MCNC: 1.9 MG/DL — SIGNIFICANT CHANGE UP (ref 1.6–2.6)
MCHC RBC-ENTMCNC: 32.5 PG — SIGNIFICANT CHANGE UP (ref 27–34)
MCHC RBC-ENTMCNC: 35.2 GM/DL — SIGNIFICANT CHANGE UP (ref 32–36)
MCV RBC AUTO: 92.4 FL — SIGNIFICANT CHANGE UP (ref 80–100)
PHOSPHATE SERPL-MCNC: 2.9 MG/DL — SIGNIFICANT CHANGE UP (ref 2.5–4.5)
PLATELET # BLD AUTO: 193 K/UL — SIGNIFICANT CHANGE UP (ref 150–400)
POTASSIUM SERPL-MCNC: 3.9 MMOL/L — SIGNIFICANT CHANGE UP (ref 3.5–5.3)
POTASSIUM SERPL-SCNC: 3.9 MMOL/L — SIGNIFICANT CHANGE UP (ref 3.5–5.3)
RBC # BLD: 3.81 M/UL — LOW (ref 4.2–5.8)
RBC # FLD: 13.5 % — SIGNIFICANT CHANGE UP (ref 10.3–14.5)
SODIUM SERPL-SCNC: 139 MMOL/L — SIGNIFICANT CHANGE UP (ref 135–145)
WBC # BLD: 8.11 K/UL — SIGNIFICANT CHANGE UP (ref 3.8–10.5)
WBC # FLD AUTO: 8.11 K/UL — SIGNIFICANT CHANGE UP (ref 3.8–10.5)

## 2018-09-27 RX ORDER — TAMSULOSIN HYDROCHLORIDE 0.4 MG/1
0.4 CAPSULE ORAL AT BEDTIME
Qty: 0 | Refills: 0 | Status: DISCONTINUED | OUTPATIENT
Start: 2018-09-27 | End: 2018-10-01

## 2018-09-27 RX ORDER — POLYETHYLENE GLYCOL 3350 17 G/17G
17 POWDER, FOR SOLUTION ORAL DAILY
Qty: 0 | Refills: 0 | Status: DISCONTINUED | OUTPATIENT
Start: 2018-09-27 | End: 2018-10-01

## 2018-09-27 RX ADMIN — Medication 975 MILLIGRAM(S): at 01:12

## 2018-09-27 RX ADMIN — Medication 100 MILLIGRAM(S): at 06:27

## 2018-09-27 RX ADMIN — ENOXAPARIN SODIUM 40 MILLIGRAM(S): 100 INJECTION SUBCUTANEOUS at 17:44

## 2018-09-27 RX ADMIN — Medication 5 MILLIGRAM(S): at 08:31

## 2018-09-27 RX ADMIN — Medication 100 MILLIGRAM(S): at 13:29

## 2018-09-27 RX ADMIN — Medication 975 MILLIGRAM(S): at 01:45

## 2018-09-27 RX ADMIN — OXYCODONE HYDROCHLORIDE 5 MILLIGRAM(S): 5 TABLET ORAL at 23:00

## 2018-09-27 RX ADMIN — OXYCODONE HYDROCHLORIDE 5 MILLIGRAM(S): 5 TABLET ORAL at 01:10

## 2018-09-27 RX ADMIN — Medication 5 MILLIGRAM(S): at 23:52

## 2018-09-27 RX ADMIN — OXYCODONE HYDROCHLORIDE 5 MILLIGRAM(S): 5 TABLET ORAL at 22:03

## 2018-09-27 RX ADMIN — OXYCODONE HYDROCHLORIDE 5 MILLIGRAM(S): 5 TABLET ORAL at 06:27

## 2018-09-27 RX ADMIN — OXYCODONE HYDROCHLORIDE 5 MILLIGRAM(S): 5 TABLET ORAL at 01:45

## 2018-09-27 RX ADMIN — DULOXETINE HYDROCHLORIDE 60 MILLIGRAM(S): 30 CAPSULE, DELAYED RELEASE ORAL at 13:29

## 2018-09-27 RX ADMIN — OXYCODONE HYDROCHLORIDE 5 MILLIGRAM(S): 5 TABLET ORAL at 07:01

## 2018-09-27 RX ADMIN — TAMSULOSIN HYDROCHLORIDE 0.4 MILLIGRAM(S): 0.4 CAPSULE ORAL at 22:32

## 2018-09-27 RX ADMIN — OXYCODONE HYDROCHLORIDE 5 MILLIGRAM(S): 5 TABLET ORAL at 13:31

## 2018-09-27 RX ADMIN — Medication 100 MILLIGRAM(S): at 21:23

## 2018-09-27 RX ADMIN — OXYCODONE HYDROCHLORIDE 5 MILLIGRAM(S): 5 TABLET ORAL at 14:00

## 2018-09-27 NOTE — CONSULT NOTE ADULT - CONSULT REASON
Thoracic compression fractures Thoracic compression fractures.  PMD is Taurus Santa/ Hospitalist is Brannon Veliz M.D.

## 2018-09-27 NOTE — CONSULT NOTE ADULT - ASSESSMENT
Pt is a 62 male to ER with complaints of falls with worsening gait, requiring a cane 1 week ago and 3 days ago he necessitated a walker and yesterday was unable to ambulate stating his legs were giving out on him, patient also reports 3 days history of difficulty controlling his urine, urgency and inability to get to the bathroom before urinating. Patient was diagnosed with thoracic compression fracture a month ago and had seen Dr. Landon, had scheduled appointment to see him this coming Thursday but came to the ED because symptoms had become so severe. Wife at bedside, denies changes in bowel, States that pain radiates down his left leg and he has intermittent numbness of the LLE.    Plan: Pt is a 62 male to ER with complaints of falls with worsening gait, requiring a cane 1 week ago and 3 days ago he necessitated a walker and yesterday was unable to ambulate stating his legs were giving out on him, patient also reports 3 days history of difficulty controlling his urine, urgency and inability to get to the bathroom before urinating. Patient was diagnosed with thoracic compression fracture a month ago and had seen Dr. Landon, had scheduled appointment to see him this coming Thursday but came to the ED because symptoms had become so severe. Wife at bedside, denies changes in bowel, States that pain radiates down his left leg and he has intermittent numbness of the LLE.    Plan: S/P  T-6-12 lumbar spinal laminectomey with spinal fusion. CBC and SMA-7 WNL. CXR clear. Heme notes reviewed in full.     Agree Cymbalta 60 mg/day for depression and pain control.     Continue to hold Avapro as BP controlled off meds.     Lovenox 40 mg/day for DVT prevention.     PT and mobilization with proper pain control. Pt is a 62 male to ER with complaints of falls with worsening gait, requiring a cane 1 week ago and 3 days ago he necessitated a walker and yesterday was unable to ambulate stating his legs were giving out on him, patient also reports 3 days history of difficulty controlling his urine, urgency and inability to get to the bathroom before urinating. Patient was diagnosed with thoracic compression fracture a month ago and had seen Dr. Landon, had scheduled appointment to see him this coming Thursday but came to the ED because symptoms had become so severe. Wife at bedside, denies changes in bowel, States that pain radiates down his left leg and he has intermittent numbness of the LLE.    Plan: S/P  T-6-12 lumbar spinal laminectomey with spinal fusion. CBC and SMA-7 WNL. CXR clear. Heme notes reviewed in full.  No active infection.   UA with RBC likely from recent maria catheter use, removed this AM for TOV.     Agree Cymbalta 60 mg/day for depression and pain control.     Continue to hold Avapro as BP controlled off meds.     Lovenox 40 mg/day for DVT prevention.     PT and mobilization with proper pain control.      Agree with inpatient rehab tomorrow, once surgical drains are removed.

## 2018-09-27 NOTE — PROGRESS NOTE ADULT - SUBJECTIVE AND OBJECTIVE BOX
HPI:  CHIEF COMPLAINT:     HPI: Pt is a 62y complains of falls with worsening gait, requiring a cane 1 week ago and 3 days ago he necessitated a walker and yesterday was unable to ambulate stating his legs were giving out on him, patient also reports 3 days history of difficulty controlling his urine, Urgency and inability to get to the bathroom before urinating. Patient was diagnosed with thoracic compression fracture a month ago and had seen Dr. Landon, had scheduled appointment to see him this coming Thursday but came to the ED because symptoms had become so severe. Wife at bedside, denies changes in bowel, States that pain radiates down his left leg and he has intermittent numbness of the LLE.    Patient is status post L4-S1 PSF in 2003 at \[facility]\"".    He underwent surgery 4 days ago, and pathology from an epidural mass revealed diffuse large B cell lymphoma. He is ambulating with a walker and feels better.      PAST MEDICAL & SURGICAL HISTORY:  Myopathy  Hypercholesteremia  Hypertension    ROS:  Negative except for: back stiffness.     MEDICATIONS  (STANDING):  ceFAZolin   IVPB 2000 milliGRAM(s) IV Intermittent every 8 hours  chlorhexidine 4% Liquid 1 Application(s) Topical <User Schedule>  docusate sodium 100 milliGRAM(s) Oral three times a day  DULoxetine 60 milliGRAM(s) Oral daily  enoxaparin Injectable 40 milliGRAM(s) SubCutaneous every 24 hours  influenza   Vaccine 0.5 milliLiter(s) IntraMuscular once  polyethylene glycol 3350 17 Gram(s) Oral daily  senna 2 Tablet(s) Oral at bedtime    MEDICATIONS  (PRN):  acetaminophen   Tablet .. 975 milliGRAM(s) Oral every 6 hours PRN Mild Pain (1 - 3)  diazepam    Tablet 5 milliGRAM(s) Oral two times a day PRN back spasms  oxyCODONE    IR 5 milliGRAM(s) Oral every 4 hours PRN Moderate Pain (4 - 6)  traMADol 50 milliGRAM(s) Oral every 6 hours PRN Severe Pain (7 - 10)      Allergies    morphine (Unknown)  Percocet 2.5/325 (Unknown)  statins (Unknown)    Intolerances        Vital Signs Last 24 Hrs  T(C): 36.7 (27 Sep 2018 08:40), Max: 37.1 (26 Sep 2018 17:30)  T(F): 98 (27 Sep 2018 08:40), Max: 98.8 (26 Sep 2018 17:30)  HR: 68 (27 Sep 2018 08:40) (68 - 85)  BP: 114/70 (27 Sep 2018 08:40) (105/67 - 125/75)  BP(mean): 81 (26 Sep 2018 15:00) (81 - 81)  RR: 18 (27 Sep 2018 08:40) (16 - 18)  SpO2: 95% (27 Sep 2018 08:40) (93% - 98%)    PHYSICAL EXAM:      Constitutional: obese man in NAD    Eyes:anicteric    ENMT:    Back: T-spine incision dressing intact    Lymph nodes:none    Respiratory: clear    Cardiovascular:RRR    Gastrointestinal:nontender    Extremities: no edema    Skin:                    LABS:                          12.4   8.11  )-----------( 193      ( 27 Sep 2018 09:48 )             35.2         Mean Cell Volume : 92.4 fl  Mean Cell Hemoglobin : 32.5 pg  Mean Cell Hemoglobin Concentration : 35.2 gm/dL  Auto Neutrophil # : x  Auto Lymphocyte # : x  Auto Monocyte # : x  Auto Eosinophil # : x  Auto Basophil # : x  Auto Neutrophil % : x  Auto Lymphocyte % : x  Auto Monocyte % : x  Auto Eosinophil % : x  Auto Basophil % : x    Serial CBC  Hematocrit 35.2  Hemoglobin 12.4  Plat 193  RBC 3.81  WBC 8.11  Serial CBC  Hematocrit 38.9  Hemoglobin 13.6  Plat 224  RBC 4.15  WBC 10.2  Serial CBC  Hematocrit 33.1  Hemoglobin 11.9  Plat 181  RBC 3.55  WBC 9.0  Serial CBC  Hematocrit 34.8  Hemoglobin 12.3  Plat 188  RBC 3.74  WBC 9.4  Serial CBC  Hematocrit 37.9  Hemoglobin 13.5  Plat 257  RBC 4.15  WBC 15.5  Serial CBC  Hematocrit 40.4  Hemoglobin 14.6  Plat 271  RBC 4.42  WBC 13.5    09-27    139  |  100  |  12  ----------------------------<  87  3.9   |  30  |  0.88    Ca    9.3      27 Sep 2018 07:15  Phos  2.9     09-27  Mg     1.9     09-27

## 2018-09-27 NOTE — PROGRESS NOTE ADULT - SUBJECTIVE AND OBJECTIVE BOX
Patient resting without complaints. Pain controlled. Ambulated  yesterday.  No chest pain, SOB, N/V.    T(C): 36.6 (09-27-18 @ 04:35), Max: 37.1 (09-26-18 @ 17:30)  HR: 69 (09-27-18 @ 04:35) (69 - 90)  BP: 119/74 (09-27-18 @ 04:35) (105/63 - 125/75)  RR: 16 (09-27-18 @ 04:35) (16 - 19)  SpO2: 94% (09-27-18 @ 04:35) (93% - 98%)  R=60cc/12H  L=15cc/12H        Exam:  Alert and Oriented, No Acute Distress  Cards: +S1/S2, RRR  Pulm: CTAB  Back: Aquacel C/D/I, ABDIAS drains (R and left) in place, No hematoma appreciated, mild incisional tenderness  Lower Extremities:  Calves Soft, Non-tender bilaterally  +PF/DF/EHL/FHL  SILT  +DP Pulse  : +[place] in place                          13.6   10.2  )-----------( 224      ( 26 Sep 2018 03:29 )             38.9    09-26    134<L>  |  99  |  13  ----------------------------<  112<H>  4.0   |  27  |  0.86    Ca    9.7      26 Sep 2018 03:29  Phos  3.3     09-26  Mg     2.1     09-26

## 2018-09-27 NOTE — PROGRESS NOTE ADULT - PROBLEM SELECTOR PLAN 2
discussed situation with patient. After rehab will complete staging with PET and BmBx. Eventual chemo (likely RCHOP) after rehab. Will discuss timing of this with Dr. Landon .

## 2018-09-27 NOTE — CONSULT NOTE ADULT - SUBJECTIVE AND OBJECTIVE BOX
Pt is a 62 male to ER with complaints of falls with worsening gait, requiring a cane 1 week ago and 3 days ago he necessitated a walker and yesterday was unable to ambulate stating his legs were giving out on him, patient also reports 3 days history of difficulty controlling his urine, urgency and inability to get to the bathroom before urinating. Patient was diagnosed with thoracic compression fracture a month ago and had seen Dr. Landon, had scheduled appointment to see him this coming Thursday but came to the ED because symptoms had become so severe. Wife at bedside, denies changes in bowel, States that pain radiates down his left leg and he has intermittent numbness of the LLE.    Patient is status post L4-S1 PSF in 2003 at [facility]      PAST MEDICAL & SURGICAL HISTORY:  Myopathy  Hypercholesteremia  Hypertension    Vital Signs Last 24 Hrs  T(C): 37 (22 Sep 2018 21:26), Max: 37 (22 Sep 2018 21:26)  T(F): 98.6 (22 Sep 2018 21:26), Max: 98.6 (22 Sep 2018 21:26)  HR: 68 (22 Sep 2018 21:26) (68 - 85)  BP: 145/89 (22 Sep 2018 21:26) (128/85 - 145/89)  BP(mean): --  RR: 16 (22 Sep 2018 21:26) (16 - 20)  SpO2: 98% (22 Sep 2018 21:26) (97% - 98%)    I&O's Detail  LABS:                      14.8   7.1   )-----------( 244      ( 22 Sep 2018 22:52 )             40.4     09-22  140  |  100  |  14  ----------------------------<  95  4.1   |  28  |  1.00    Ca    10.4      22 Sep 2018 22:52  TPro  7.1  /  Alb  4.0  /  TBili  0.7  /  DBili  x   /  AST  22  /  ALT  20  /  AlkPhos  64  09-22  PT/INR - ( 22 Sep 2018 22:52 )   PT: 11.9 sec;   INR: 1.09 ratio    PTT - ( 22 Sep 2018 22:52 )  PTT:34.6 sec    RADIOLOGY & ADDITIONAL STUDIES:    PHYSICAL EXAM:  General; Awake and alert, Oriented x 3  Hips/Pelvis:   ABle to SLR bilaterally. Negative log roll, heel strike. No pain on passive Int/Ext hip rotation.  Spine exam:  Neck: supple, NT, Full Painless baseline AROM  Back: TTP Thoracic spine Pt is a 62 male to ER with complaints of falls with worsening gait, requiring a cane 1 week ago and 3 days ago he necessitated a walker and yesterday was unable to ambulate stating his legs were giving out on him, patient also reports 3 days history of difficulty controlling his urine, urgency and inability to get to the bathroom before urinating. Patient was diagnosed with thoracic compression fracture a month ago and had seen Dr. Landon, had scheduled appointment to see him this coming Thursday but came to the ED because symptoms had become so severe. Wife at bedside, denies changes in bowel, States that pain radiates down his left leg and he has intermittent numbness of the LLE.    Patient is status post L4-S1 PSF in 2003 at [facility]      PAST MEDICAL & SURGICAL HISTORY:  Hypercholesteremia  Hypertension  B-cell Lymphoma    Vital Signs Last 24 Hrs  T(C): 37 (22 Sep 2018 21:26), Max: 37 (22 Sep 2018 21:26)  T(F): 98.6 (22 Sep 2018 21:26), Max: 98.6 (22 Sep 2018 21:26)  HR: 68 (22 Sep 2018 21:26) (68 - 85)  BP: 114/70 (22 Sep 2018 21:26) (128/85 - 145/89)  BP(mean): --  RR: 16 (22 Sep 2018 21:26) (16 - 20)  SpO2: 98% (22 Sep 2018 21:26) (97% - 98%)    I&O's Detail  LABS:                      14.8   7.1   )-----------( 244      ( 22 Sep 2018 22:52 )             40.4     09-22  140  |  100  |  14  ----------------------------<  95  4.1   |  28  |  1.00    Ca    10.4      22 Sep 2018 22:52  TPro  7.1  /  Alb  4.0  /  TBili  0.7  /  DBili  x   /  AST  22  /  ALT  20  /  AlkPhos  64  09-22  PT/INR - ( 22 Sep 2018 22:52 )   PT: 11.9 sec;   INR: 1.09 ratio    PTT - ( 22 Sep 2018 22:52 )  PTT:34.6 sec    RADIOLOGY & ADDITIONAL STUDIES:    PHYSICAL EXAM:  General; Awake and alert, Oriented x 3  Hips/Pelvis:   ABle to SLR bilaterally. Negative log roll, heel strike. No pain on passive Int/Ext hip rotation.  Spine exam:  Neck: supple, NT, Full Painless baseline AROM  Back: TTP Thoracic spine Pt is a 62 male to ER with complaints of falls with worsening gait, requiring a cane 1 week ago and 3 days ago he necessitated a walker and yesterday was unable to ambulate stating his legs were giving out on him, patient also reports 3 days history of difficulty controlling his urine, urgency and inability to get to the bathroom before urinating. Patient was diagnosed with thoracic compression fracture a month ago and had seen Dr. Landon, had scheduled appointment to see him this coming Thursday but came to the ED because symptoms had become so severe. Wife at bedside, denies changes in bowel, States that pain radiates down his left leg and he has intermittent numbness of the LLE.    Patient is status post L4-S1 PSF in 2003 at [facility] 2017    PAST MEDICAL & SURGICAL HISTORY:  Hypercholesteremia  Hypertension  B-cell Lymphoma, recently diagnosed    Vital Signs Last 24 Hrs  T(C): 37 (22 Sep 2018 21:26), Max: 37 (22 Sep 2018 21:26)  T(F): 98.6 (22 Sep 2018 21:26), Max: 98.6 (22 Sep 2018 21:26)  HR: 68 (22 Sep 2018 21:26) (68 - 85)  BP: 114/70 (22 Sep 2018 21:26) (128/85 - 145/89)  BP(mean): --  RR: 16 (22 Sep 2018 21:26) (16 - 20)  SpO2: 98% (22 Sep 2018 21:26) (97% - 98%)    I&O's Detail  LABS:                      14.8   7.1   )-----------( 244      ( 22 Sep 2018 22:52 )             40.4     09-22  140  |  100  |  14  ----------------------------<  95  4.1   |  28  |  1.00    Ca    10.4      22 Sep 2018 22:52  TPro  7.1  /  Alb  4.0  /  TBili  0.7  /  DBili  x   /  AST  22  /  ALT  20  /  AlkPhos  64  09-22  PT/INR - ( 22 Sep 2018 22:52 )   PT: 11.9 sec;   INR: 1.09 ratio    PTT - ( 22 Sep 2018 22:52 )  PTT:34.6 sec    RADIOLOGY & ADDITIONAL STUDIES:    PHYSICAL EXAM:  General; Awake and alert, Oriented x 3  Hips/Pelvis:   ABle to SLR bilaterally. Negative log roll, heel strike. No pain on passive Int/Ext hip rotation.  Spine exam:  Neck: supple, NT, Full Painless baseline AROM  Back: TTP Thoracic spine, Spine brace in place.   MP 5/5 both lower extremities, LT sensation intact both lower extremities.

## 2018-09-28 ENCOUNTER — TRANSCRIPTION ENCOUNTER (OUTPATIENT)
Age: 63
End: 2018-09-28

## 2018-09-28 LAB — CHROM ANALY INTERPHASE BLD FISH-IMP: SIGNIFICANT CHANGE UP

## 2018-09-28 PROCEDURE — 99254 IP/OBS CNSLTJ NEW/EST MOD 60: CPT | Mod: GC

## 2018-09-28 RX ADMIN — OXYCODONE HYDROCHLORIDE 5 MILLIGRAM(S): 5 TABLET ORAL at 06:21

## 2018-09-28 RX ADMIN — ENOXAPARIN SODIUM 40 MILLIGRAM(S): 100 INJECTION SUBCUTANEOUS at 17:23

## 2018-09-28 RX ADMIN — OXYCODONE HYDROCHLORIDE 5 MILLIGRAM(S): 5 TABLET ORAL at 23:43

## 2018-09-28 RX ADMIN — OXYCODONE HYDROCHLORIDE 5 MILLIGRAM(S): 5 TABLET ORAL at 11:50

## 2018-09-28 RX ADMIN — TAMSULOSIN HYDROCHLORIDE 0.4 MILLIGRAM(S): 0.4 CAPSULE ORAL at 21:51

## 2018-09-28 RX ADMIN — Medication 100 MILLIGRAM(S): at 06:20

## 2018-09-28 RX ADMIN — OXYCODONE HYDROCHLORIDE 5 MILLIGRAM(S): 5 TABLET ORAL at 11:18

## 2018-09-28 RX ADMIN — DULOXETINE HYDROCHLORIDE 60 MILLIGRAM(S): 30 CAPSULE, DELAYED RELEASE ORAL at 13:34

## 2018-09-28 RX ADMIN — Medication 5 MILLIGRAM(S): at 21:51

## 2018-09-28 NOTE — DISCHARGE NOTE ADULT - PATIENT PORTAL LINK FT
You can access the [facility] Patient Portal, offered by [facility], by registering with the following website: [email or web address]

## 2018-09-28 NOTE — PROGRESS NOTE ADULT - SUBJECTIVE AND OBJECTIVE BOX
INTERVAL HPI/OVERNIGHT EVENTS:  Pt seen and examined at bedside.     Allergies/Intolerance: morphine (Unknown)  Percocet 2.5/325 (Unknown)  statins (Unknown)      MEDICATIONS  (STANDING):  docusate sodium 100 milliGRAM(s) Oral three times a day  DULoxetine 60 milliGRAM(s) Oral daily  enoxaparin Injectable 40 milliGRAM(s) SubCutaneous every 24 hours  influenza   Vaccine 0.5 milliLiter(s) IntraMuscular once  polyethylene glycol 3350 17 Gram(s) Oral daily  senna 2 Tablet(s) Oral at bedtime  tamsulosin 0.4 milliGRAM(s) Oral at bedtime    MEDICATIONS  (PRN):  acetaminophen   Tablet .. 975 milliGRAM(s) Oral every 6 hours PRN Mild Pain (1 - 3)  diazepam    Tablet 5 milliGRAM(s) Oral two times a day PRN back spasms  oxyCODONE    IR 5 milliGRAM(s) Oral every 4 hours PRN Moderate Pain (4 - 6)  traMADol 50 milliGRAM(s) Oral every 6 hours PRN Severe Pain (7 - 10)        ROS: all systems reviewed and wnl      PHYSICAL EXAMINATION:  Vital Signs Last 24 Hrs  T(C): 36.9 (28 Sep 2018 08:42), Max: 37.1 (28 Sep 2018 00:13)  T(F): 98.5 (28 Sep 2018 08:42), Max: 98.7 (28 Sep 2018 00:13)  HR: 79 (28 Sep 2018 08:42) (71 - 83)  BP: 110/70 (28 Sep 2018 08:42) (102/68 - 135/83)  BP(mean): --  RR: 18 (28 Sep 2018 08:42) (16 - 18)  SpO2: 96% (28 Sep 2018 08:42) (94% - 97%)  CAPILLARY BLOOD GLUCOSE          09-27 @ 07:01  -  09-28 @ 07:00  --------------------------------------------------------  IN: 540 mL / OUT: 1487 mL / NET: -947 mL    09-28 @ 07:01 - 09-28 @ 11:49  --------------------------------------------------------  IN: 280 mL / OUT: 0 mL / NET: 280 mL        GENERAL:   NECK: supple, No JVD  CHEST/LUNG: clear to auscultation bilaterally; no rales, rhonchi, or wheezing b/l  HEART: normal S1, S2  ABDOMEN: BS+, soft, ND, NT   EXTREMITIES:  pulses palpable; no clubbing, cyanosis, or edema b/l LEs  SKIN: no rashes or lesions      LABS:                        12.4   8.11  )-----------( 193      ( 27 Sep 2018 09:48 )             35.2     09-27    139  |  100  |  12  ----------------------------<  87  3.9   |  30  |  0.88    Ca    9.3      27 Sep 2018 07:15  Phos  2.9     09-27  Mg     1.9     09-27 INTERVAL HPI/OVERNIGHT EVENTS:  Pt seen and examined at bedside.     Allergies/Intolerance: morphine (Unknown)  Percocet 2.5/325 (Unknown)  statins (Unknown)      MEDICATIONS  (STANDING):  docusate sodium 100 milliGRAM(s) Oral three times a day  DULoxetine 60 milliGRAM(s) Oral daily  enoxaparin Injectable 40 milliGRAM(s) SubCutaneous every 24 hours  influenza   Vaccine 0.5 milliLiter(s) IntraMuscular once  polyethylene glycol 3350 17 Gram(s) Oral daily  senna 2 Tablet(s) Oral at bedtime  tamsulosin 0.4 milliGRAM(s) Oral at bedtime    MEDICATIONS  (PRN):  acetaminophen   Tablet .. 975 milliGRAM(s) Oral every 6 hours PRN Mild Pain (1 - 3)  diazepam    Tablet 5 milliGRAM(s) Oral two times a day PRN back spasms  oxyCODONE    IR 5 milliGRAM(s) Oral every 4 hours PRN Moderate Pain (4 - 6)  traMADol 50 milliGRAM(s) Oral every 6 hours PRN Severe Pain (7 - 10)        ROS: all systems reviewed and wnl      PHYSICAL EXAMINATION:  Vital Signs Last 24 Hrs  T(C): 36.9 (28 Sep 2018 08:42), Max: 37.1 (28 Sep 2018 00:13)  T(F): 98.5 (28 Sep 2018 08:42), Max: 98.7 (28 Sep 2018 00:13)  HR: 79 (28 Sep 2018 08:42) (71 - 83)  BP: 110/70 (28 Sep 2018 08:42) (102/68 - 135/83)  BP(mean): --  RR: 18 (28 Sep 2018 08:42) (16 - 18)  SpO2: 96% (28 Sep 2018 08:42) (94% - 97%)  CAPILLARY BLOOD GLUCOSE          09-27 @ 07:01  -  09-28 @ 07:00  --------------------------------------------------------  IN: 540 mL / OUT: 1487 mL / NET: -947 mL    09-28 @ 07:01 - 09-28 @ 11:49  --------------------------------------------------------  IN: 280 mL / OUT: 0 mL / NET: 280 mL        GENERAL: asleep in bed, afebrile, surgical drains still in place  NECK: supple, No JVD  CHEST/LUNG: clear to auscultation bilaterally; no rales, rhonchi, or wheezing b/l  HEART: normal S1, S2  ABDOMEN: BS+, soft, ND, NT   EXTREMITIES:  pulses palpable; no clubbing, cyanosis, or edema b/l LEs  SKIN: no rashes or lesions      LABS:                        12.4   8.11  )-----------( 193      ( 27 Sep 2018 09:48 )             35.2     09-27    139  |  100  |  12  ----------------------------<  87  3.9   |  30  |  0.88    Ca    9.3      27 Sep 2018 07:15  Phos  2.9     09-27  Mg     1.9     09-27

## 2018-09-28 NOTE — DISCHARGE NOTE ADULT - MEDICATION SUMMARY - MEDICATIONS TO TAKE
I will START or STAY ON the medications listed below when I get home from the hospital:    acetaminophen 325 mg oral tablet  -- 3 tab(s) by mouth every 6 hours, As needed, Mild Pain (1 - 3)  -- Indication: For pain    oxyCODONE 5 mg oral tablet  -- 1-2 tab(s) by mouth every 4-6 hours, As needed, for moderate to severe Pain. MDD:8  -- Indication: For pain    irbesartan 75 mg oral tablet  -- 1 tab(s) by mouth once a day  -- Indication: For Blood pressure    tamsulosin 0.4 mg oral capsule  -- 1 cap(s) by mouth once a day (at bedtime) MDD:1  -- Indication: For BPH (benign prostatic hyperplasia)    enoxaparin 40 mg/0.4 mL injectable solution  -- 40 milligram(s) subcutaneously once a dayx 3 more weeks. MDD:40mg  -- It is very important that you take or use this exactly as directed.  Do not skip doses or discontinue unless directed by your doctor.    -- Indication: For Dvt prevention    Valium 5 mg oral tablet  -- 1 tab(s) by mouth 2 times a day, As Needed  -- Indication: For Spasm/anxiety    Cymbalta 60 mg oral delayed release capsule  -- 1 cap(s) by mouth once a day  -- Indication: For pain/depression    Repatha 140 mg/mL subcutaneous solution  -- subcutaneous every 2 weeks  -- Indication: For Cholesterol    senna oral tablet  -- 2 tab(s) by mouth once a day (at bedtime)  -- Indication: For Laxative    docusate sodium 100 mg oral capsule  -- 1 cap(s) by mouth 3 times a day  -- Indication: For Stool softener

## 2018-09-28 NOTE — PROGRESS NOTE ADULT - SUBJECTIVE AND OBJECTIVE BOX
Patient resting without complaints. C/o marija-incisional back pain, controlled. Has been ambulating. yesterday. No chest pain, SOB, N/V.    Vital Signs Last 24 Hrs  T(C): 36.4 (28 Sep 2018 04:31), Max: 37.1 (28 Sep 2018 00:13)  T(F): 97.6 (28 Sep 2018 04:31), Max: 98.7 (28 Sep 2018 00:13)  HR: 71 (28 Sep 2018 04:31) (68 - 83)  BP: 117/71 (28 Sep 2018 04:31) (102/68 - 135/83)  BP(mean): --  RR: 18 (28 Sep 2018 04:31) (16 - 18)  SpO2: 95% (28 Sep 2018 04:31) (94% - 97%)  R ABDIAS 60/120  L ABDIAS 17/17    Exam:  Alert and Oriented, No Acute Distress  Cards: +S1/S2, RRR  Pulm: CTAB  Back: Aquacel C/D/I, ABDIAS drains (R and left) in place, No hematoma appreciated, mild incisional tenderness  Lower Extremities:  Calves Soft, Non-tender bilaterally  5/5 B/L UE delt/bi/tri/WE/WF/intrinsics, SILT C5-T1  5/5 B/L LE IP/H/Q/TA/EHL/GS/FHL, SILT L2-S1  SILT  +DP Pulse

## 2018-09-28 NOTE — DISCHARGE NOTE ADULT - NS AS ACTIVITY OBS
continue weight bearing as tolerated ambulation/physical therapy/Do not make important decisions/Do not drive or operate machinery/Walking-Outdoors allowed/Stairs allowed/Walking-Indoors allowed/No Heavy lifting/straining

## 2018-09-28 NOTE — CONSULT NOTE ADULT - ATTENDING COMMENTS
Seen and examined with resident. Agree with note.   Patient with gait dysfunction, urinary retention, spasticity.  Patient will need acute rehabilitation when stable.   Consider baclofen for spasticity

## 2018-09-28 NOTE — CONSULT NOTE ADULT - SUBJECTIVE AND OBJECTIVE BOX
CC: frequent falls, difficulty walking    HPI: Pt is a 62y M with hx of Thoracic compression fracture, ?Myopathy, HTN, HLD presented on 9/22 and complained of falls with worsening gait, requiring a cane 1 week prior to admission and 3 days prior to admission he necessitated a walker. Patient also reported 3 days history of difficulty controlling his urine, Urgency and inability to get to the bathroom before urinating. Patient was diagnosed with thoracic compression fracture a month ago and had seen Dr. Landon and had scheduled appointment to see him, but came to the ED because symptoms had become so severe. Stated that his pain radiateed down his left leg and he had intermittent numbness of the LLE.    Patient was found to have a pathologic compression fracture at T9 with retropulsion causing compression of the spinal cord  Patient underwent a thoracic laminectomy of T6-T11 with PSF on 9/23 with Dr. Landon. Patient was found to have Large B-Cell lymphoma with chemo planning after rehab.         REVIEW OF SYSTEMS: No chest pain, shortness of breath, nausea, vomiting or diarrhea other ROS neg     PAST MEDICAL & SURGICAL HISTORY  BPH (benign prostatic hyperplasia)  Myopathy  Hypercholesteremia  Hypertension  No pertinent past medical history  Lumbar spinal stenosis  S/P repair of hydrocele  Hemorrhoids, unspecified hemorrhoid type  Disorder of thymus gland  S/P TURP (status post transurethral resection of prostate)  Patient is status post L4-S1 PSF in 2003 at [facility]      FUNCTIONAL HISTORY:   Lives with wife/son/daughter in private house with 6 LUIS and 4 inside.   Independent in ambulation and ADLs prior    CURRENT FUNCTIONAL STATUS:  Transfers: contact guard  Ambulation:35ft x 2 with min assist and RW    FAMILY HISTORY   non-contributory    RECENT LABS/IMAGING  CBC Full  -  ( 27 Sep 2018 09:48 )  WBC Count : 8.11 K/uL  Hemoglobin : 12.4 g/dL  Hematocrit : 35.2 %  Platelet Count - Automated : 193 K/uL  Mean Cell Volume : 92.4 fl  Mean Cell Hemoglobin : 32.5 pg  Mean Cell Hemoglobin Concentration : 35.2 gm/dL  Auto Neutrophil # : x  Auto Lymphocyte # : x  Auto Monocyte # : x  Auto Eosinophil # : x  Auto Basophil # : x  Auto Neutrophil % : x  Auto Lymphocyte % : x  Auto Monocyte % : x  Auto Eosinophil % : x  Auto Basophil % : x    09-27    139  |  100  |  12  ----------------------------<  87  3.9   |  30  |  0.88    Ca    9.3      27 Sep 2018 07:15  Phos  2.9     09-27  Mg     1.9     09-27    MRI C/T/L Spine: Pathologic metastatic fracture deformity of T9 with loss of 80% of   vertebral height and retropulsion into spinal canal by 6 mm with   extraosseous tumoral prevertebral and epidural involvement compressing   the thoracic cord which demonstrates hyperintense T2 and STIR signal   concerning for cord edema and/or myelomalacia with a small amount of CSF   along the dorsal margin of the spinal canal.    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    Decreased T1 and hyperintense STIR signal within the sacrum involving the   posterior elements at the S2 level concerning for osseous metastasis.   Postoperative fixation screws in the left  L4 vertebra and sacrum, bone   grafts L4-5 and L5-S1 level, only noncontrast sagittal T1 and STIR   images, strongly suggest imaging with gadolinium to assess tumor and   granulation scar tissue.    CT T-Spine: Metastatic pathologic compression fracture deformity of the T9 vertebra   with 3 column involvement, loss of 80% of height epidural and   prevertebral extension, and extension to the bilateral T8-9 and T9-10   neural foramina.    VITALS  T(C): 36.9 (09-28-18 @ 08:42), Max: 37.1 (09-28-18 @ 00:13)  HR: 79 (09-28-18 @ 08:42) (71 - 83)  BP: 110/70 (09-28-18 @ 08:42) (102/68 - 135/83)  RR: 18 (09-28-18 @ 08:42) (16 - 18)  SpO2: 96% (09-28-18 @ 08:42) (94% - 97%)    ALLERGIES  morphine (Unknown)  Percocet 2.5/325 (Unknown)  statins (Unknown)      MEDICATIONS   acetaminophen   Tablet .. 975 milliGRAM(s) Oral every 6 hours PRN  ceFAZolin   IVPB 2000 milliGRAM(s) IV Intermittent every 8 hours  diazepam    Tablet 5 milliGRAM(s) Oral two times a day PRN  docusate sodium 100 milliGRAM(s) Oral three times a day  DULoxetine 60 milliGRAM(s) Oral daily  enoxaparin Injectable 40 milliGRAM(s) SubCutaneous every 24 hours  influenza   Vaccine 0.5 milliLiter(s) IntraMuscular once  oxyCODONE    IR 5 milliGRAM(s) Oral every 4 hours PRN  polyethylene glycol 3350 17 Gram(s) Oral daily  senna 2 Tablet(s) Oral at bedtime  tamsulosin 0.4 milliGRAM(s) Oral at bedtime  traMADol 50 milliGRAM(s) Oral every 6 hours PRN      ----------------------------------------------------------------------------------------  PHYSICAL EXAM  Constitutional - NAD, Comfortable  HEENT - NCAT, EOMI  Neck - Supple, No limited ROM  Chest - CTA bilaterally, No wheeze, No rhonchi, No crackles  Cardiovascular -  S1S2, No murmurs  Abdomen - BS+, Soft, NTND  Extremities -  No calf tenderness   Neurologic Exam -                    Cognitive - Awake, Alert, AAO to self, place, date, year, situation     Communication - Fluent, No dysarthria, no aphasia     Cranial Nerves - CN 2-12 intact     Motor - No focal deficits     Sensory - Intact to LT     Reflexes - DTR Intact, No primitive reflexive     Balance - WNL Static  Psychiatric - Mood stable, Affect WNL      Impression:  62 y.o. M with hx of compression fracture presented for worsening gait/weakness, found to have T9 compression fracture associated with spinal cord compression s/p T6-T11 laminectomy and fusion, now with gait and ADL dysfunctions.     Plan:  PT- ROM, Bed Mob, Transfers, Amb w AD and bracing as needed  OT- ADLs, bracing if needed  Prec- Falls, Cardiac, Spinal  DVT Prophylaxis - Lovenox  Skin- Turn q2 h, daily skin checks  Dispo- CC: frequent falls, difficulty walking    HPI: Pt is a 62y M with hx of Thoracic compression fracture, ?Myopathy, HTN, HLD presented on 9/22 and complained of falls with worsening gait, requiring a cane 1 week prior to admission and 3 days prior to admission he necessitated a walker. Patient also reported 3 days history of difficulty controlling his urine, Urgency and inability to get to the bathroom before urinating. Patient was diagnosed with thoracic compression fracture a month ago and had seen Dr. Landon and had scheduled appointment to see him, but came to the ED because symptoms had become so severe. Stated that his pain radiateed down his left leg and he had intermittent numbness of the LLE.    Patient was found to have a pathologic compression fracture at T9 with retropulsion causing compression of the spinal cord  Patient underwent a thoracic laminectomy of T6-T11 with PSF on 9/23 with Dr. Landon. Patient was found to have Large B-Cell lymphoma with chemo planning after rehab.     Currently complains of incisional site pain, otherwise feels okay. Patient says his LLE pain/weakness has resolved since the surgery.    REVIEW OF SYSTEMS: +incisional site pain, No chest pain, shortness of breath, nausea, vomiting or diarrhea other ROS neg     PAST MEDICAL & SURGICAL HISTORY  BPH (benign prostatic hyperplasia)  Myopathy  Hypercholesteremia  Hypertension  No pertinent past medical history  Lumbar spinal stenosis  S/P repair of hydrocele  Hemorrhoids, unspecified hemorrhoid type  Disorder of thymus gland  S/P TURP (status post transurethral resection of prostate)  Patient is status post L4-S1 PSF in 2003 at \[facility]\""      FUNCTIONAL HISTORY:   Lives with wife/son/daughter in private house with 6 LUIS and 4 inside.   Independent in ambulation and ADLs prior    CURRENT FUNCTIONAL STATUS:  Transfers: contact guard  Ambulation:35ft x 2 with min assist and RW    FAMILY HISTORY   non-contributory    RECENT LABS/IMAGING  CBC Full  -  ( 27 Sep 2018 09:48 )  WBC Count : 8.11 K/uL  Hemoglobin : 12.4 g/dL  Hematocrit : 35.2 %  Platelet Count - Automated : 193 K/uL  Mean Cell Volume : 92.4 fl  Mean Cell Hemoglobin : 32.5 pg  Mean Cell Hemoglobin Concentration : 35.2 gm/dL  Auto Neutrophil # : x  Auto Lymphocyte # : x  Auto Monocyte # : x  Auto Eosinophil # : x  Auto Basophil # : x  Auto Neutrophil % : x  Auto Lymphocyte % : x  Auto Monocyte % : x  Auto Eosinophil % : x  Auto Basophil % : x    09-27    139  |  100  |  12  ----------------------------<  87  3.9   |  30  |  0.88    Ca    9.3      27 Sep 2018 07:15  Phos  2.9     09-27  Mg     1.9     09-27    MRI C/T/L Spine: Pathologic metastatic fracture deformity of T9 with loss of 80% of   vertebral height and retropulsion into spinal canal by 6 mm with   extraosseous tumoral prevertebral and epidural involvement compressing   the thoracic cord which demonstrates hyperintense T2 and STIR signal   concerning for cord edema and/or myelomalacia with a small amount of CSF   along the dorsal margin of the spinal canal.    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    Decreased T1 and hyperintense STIR signal within the sacrum involving the   posterior elements at the S2 level concerning for osseous metastasis.   Postoperative fixation screws in the left  L4 vertebra and sacrum, bone   grafts L4-5 and L5-S1 level, only noncontrast sagittal T1 and STIR   images, strongly suggest imaging with gadolinium to assess tumor and   granulation scar tissue.    CT T-Spine: Metastatic pathologic compression fracture deformity of the T9 vertebra   with 3 column involvement, loss of 80% of height epidural and   prevertebral extension, and extension to the bilateral T8-9 and T9-10   neural foramina.    VITALS  T(C): 36.9 (09-28-18 @ 08:42), Max: 37.1 (09-28-18 @ 00:13)  HR: 79 (09-28-18 @ 08:42) (71 - 83)  BP: 110/70 (09-28-18 @ 08:42) (102/68 - 135/83)  RR: 18 (09-28-18 @ 08:42) (16 - 18)  SpO2: 96% (09-28-18 @ 08:42) (94% - 97%)    ALLERGIES  morphine (Unknown)  Percocet 2.5/325 (Unknown)  statins (Unknown)      MEDICATIONS   acetaminophen   Tablet .. 975 milliGRAM(s) Oral every 6 hours PRN  ceFAZolin   IVPB 2000 milliGRAM(s) IV Intermittent every 8 hours  diazepam    Tablet 5 milliGRAM(s) Oral two times a day PRN  docusate sodium 100 milliGRAM(s) Oral three times a day  DULoxetine 60 milliGRAM(s) Oral daily  enoxaparin Injectable 40 milliGRAM(s) SubCutaneous every 24 hours  influenza   Vaccine 0.5 milliLiter(s) IntraMuscular once  oxyCODONE    IR 5 milliGRAM(s) Oral every 4 hours PRN  polyethylene glycol 3350 17 Gram(s) Oral daily  senna 2 Tablet(s) Oral at bedtime  tamsulosin 0.4 milliGRAM(s) Oral at bedtime  traMADol 50 milliGRAM(s) Oral every 6 hours PRN      ----------------------------------------------------------------------------------------  PHYSICAL EXAM  Constitutional - NAD, Comfortable in TLSO  HEENT - NCAT, EOMI  Neck - Supple,  Chest - CTA bilaterally, No wheeze  Cardiovascular -  S1S2  Abdomen - did not examine due to TLSO  Extremities -  No calf tenderness   Neurologic Exam -                    Cognitive - Awake, Alert, AAO to self, place, date, year, situation     Communication - Fluent, No dysarthria, no aphasia     Cranial Nerves - CN 2-12 intact     Motor - grossly 5/5 strength in b/l UE and LE     Sensory - Intact to LT     Reflexes - DTR Intact, No primitive reflexive  Psychiatric - Mood stable, Affect WNL      Impression:  62 y.o. M with hx of compression fracture presented for worsening gait/weakness, found to have T9 compression fracture associated with spinal cord compression s/p T6-T11 laminectomy and fusion, now with gait and ADL dysfunctions.     Plan:  PT- ROM, Bed Mob, Transfers, Amb w AD and bracing as needed  OT- ADLs, bracing if needed  Prec- Falls, Cardiac, Spinal  DVT Prophylaxis - Lovenox  Skin- Turn q2 h, daily skin checks  Dispo- CC: frequent falls, difficulty walking    HPI: Pt is a 62y M with hx of Thoracic compression fracture, ?Myopathy, HTN, HLD presented on 9/22 and complained of falls with worsening gait, requiring a cane 1 week prior to admission and 3 days prior to admission he necessitated a walker. Patient also reported 3 days history of difficulty controlling his urine, Urgency and inability to get to the bathroom before urinating. Patient was diagnosed with thoracic compression fracture a month ago and had seen Dr. Landon and had scheduled appointment to see him, but came to the ED because symptoms had become so severe. Stated that his pain radiateed down his left leg and he had intermittent numbness of the LLE.    Patient was found to have a pathologic compression fracture at T9 with retropulsion causing compression of the spinal cord  Patient underwent a thoracic laminectomy of T6-T11 with PSF on 9/23 with Dr. Landon. Patient was found to have Large B-Cell lymphoma with chemo planning after rehab.     Currently complains of incisional site pain, otherwise feels okay. Patient says his LLE pain/weakness has resolved since the surgery.    REVIEW OF SYSTEMS: +incisional site pain, + urinary retention, + LE spasticity No chest pain, shortness of breath, nausea, vomiting or diarrhea other ROS neg     PAST MEDICAL & SURGICAL HISTORY  BPH (benign prostatic hyperplasia)  Myopathy  Hypercholesteremia  Hypertension  No pertinent past medical history  Lumbar spinal stenosis  S/P repair of hydrocele  Hemorrhoids, unspecified hemorrhoid type  Disorder of thymus gland  S/P TURP (status post transurethral resection of prostate)  Patient is status post L4-S1 PSF in 2003 at \[facility]\""      FUNCTIONAL HISTORY:   Lives with wife/son/daughter in private house with 6 LUIS and 4 inside.   Independent in ambulation and ADLs prior    CURRENT FUNCTIONAL STATUS:  Transfers: contact guard  Ambulation:35ft x 2 with min assist and RW    FAMILY HISTORY   non-contributory    RECENT LABS/IMAGING  CBC Full  -  ( 27 Sep 2018 09:48 )  WBC Count : 8.11 K/uL  Hemoglobin : 12.4 g/dL  Hematocrit : 35.2 %  Platelet Count - Automated : 193 K/uL  Mean Cell Volume : 92.4 fl  Mean Cell Hemoglobin : 32.5 pg  Mean Cell Hemoglobin Concentration : 35.2 gm/dL  Auto Neutrophil # : x  Auto Lymphocyte # : x  Auto Monocyte # : x  Auto Eosinophil # : x  Auto Basophil # : x  Auto Neutrophil % : x  Auto Lymphocyte % : x  Auto Monocyte % : x  Auto Eosinophil % : x  Auto Basophil % : x    09-27    139  |  100  |  12  ----------------------------<  87  3.9   |  30  |  0.88    Ca    9.3      27 Sep 2018 07:15  Phos  2.9     09-27  Mg     1.9     09-27    MRI C/T/L Spine: Pathologic metastatic fracture deformity of T9 with loss of 80% of   vertebral height and retropulsion into spinal canal by 6 mm with   extraosseous tumoral prevertebral and epidural involvement compressing   the thoracic cord which demonstrates hyperintense T2 and STIR signal   concerning for cord edema and/or myelomalacia with a small amount of CSF   along the dorsal margin of the spinal canal.    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    Decreased T1 and hyperintense STIR signal within the sacrum involving the   posterior elements at the S2 level concerning for osseous metastasis.   Postoperative fixation screws in the left  L4 vertebra and sacrum, bone   grafts L4-5 and L5-S1 level, only noncontrast sagittal T1 and STIR   images, strongly suggest imaging with gadolinium to assess tumor and   granulation scar tissue.    CT T-Spine: Metastatic pathologic compression fracture deformity of the T9 vertebra   with 3 column involvement, loss of 80% of height epidural and   prevertebral extension, and extension to the bilateral T8-9 and T9-10   neural foramina.    VITALS  T(C): 36.9 (09-28-18 @ 08:42), Max: 37.1 (09-28-18 @ 00:13)  HR: 79 (09-28-18 @ 08:42) (71 - 83)  BP: 110/70 (09-28-18 @ 08:42) (102/68 - 135/83)  RR: 18 (09-28-18 @ 08:42) (16 - 18)  SpO2: 96% (09-28-18 @ 08:42) (94% - 97%)    ALLERGIES  morphine (Unknown)  Percocet 2.5/325 (Unknown)  statins (Unknown)      MEDICATIONS   acetaminophen   Tablet .. 975 milliGRAM(s) Oral every 6 hours PRN  ceFAZolin   IVPB 2000 milliGRAM(s) IV Intermittent every 8 hours  diazepam    Tablet 5 milliGRAM(s) Oral two times a day PRN  docusate sodium 100 milliGRAM(s) Oral three times a day  DULoxetine 60 milliGRAM(s) Oral daily  enoxaparin Injectable 40 milliGRAM(s) SubCutaneous every 24 hours  influenza   Vaccine 0.5 milliLiter(s) IntraMuscular once  oxyCODONE    IR 5 milliGRAM(s) Oral every 4 hours PRN  polyethylene glycol 3350 17 Gram(s) Oral daily  senna 2 Tablet(s) Oral at bedtime  tamsulosin 0.4 milliGRAM(s) Oral at bedtime  traMADol 50 milliGRAM(s) Oral every 6 hours PRN      ----------------------------------------------------------------------------------------  PHYSICAL EXAM  Constitutional - NAD, Comfortable in TLSO  HEENT - NCAT, EOMI  Neck - Supple,  Chest - CTA bilaterally, No wheeze  Cardiovascular -  S1S2  Abdomen - did not examine due to TLSO  Extremities -  No calf tenderness   Neurologic Exam -                    Cognitive - Awake, Alert, AAO to self, place, date, year, situation     Communication - Fluent, No dysarthria, no aphasia     Cranial Nerves - CN 2-12 intact     Motor - grossly 5/5 strength in b/l UE and LE     Sensory - Intact to LT     Reflexes - DTR Intact, No primitive reflexive, some LE spasms noted  Psychiatric - Mood stable, Affect WNL      Impression:  62 y.o. M with hx of compression fracture presented for worsening gait/weakness, found to have T9 compression fracture associated with spinal cord compression s/p T6-T11 laminectomy and fusion, now with gait and ADL dysfunctions.     Plan:  PT- ROM, Bed Mob, Transfers, Amb w AD and bracing as needed  OT- ADLs, bracing if needed  Prec- Falls, Cardiac, Spinal  DVT Prophylaxis - Lovenox  Bladder- continue check PVRs, may need uro w/u as outpt and training in IC at rehab  Spasticity- may consider baclofen 10mg BID  Skin- Turn q2 h, daily skin checks  Dispo- Acute Rehab- can tolerate 3h/ d PT/OT/SLP and requires daily physician visits.

## 2018-09-28 NOTE — DISCHARGE NOTE ADULT - CARE PROVIDER_API CALL
Tomi Landon), Orthopaedic Surgery  [address]  [place] [address]  Phone: [phone]  Fax: [phone]    Marcel De Anda), Hematology; Medical Oncology  [address]  [place] [address]  Phone: [phone]  Fax: [phone]    Brannon Veliz), Internal Medicine  [address] [place] [address]  Phone: [phone]  Fax: [phone]

## 2018-09-28 NOTE — CONSULT NOTE ADULT - REASON FOR ADMISSION
Worsening of Thoracic compression fracture, recurrent falls

## 2018-09-28 NOTE — DISCHARGE NOTE ADULT - HOSPITAL COURSE
Reason for Admission: Worsening of Thoracic compression fracture, recurrent falls	  History of Present Illness: 	                                             HPI: Pt is a 62y complains of falls with worsening gait, requiring a cane 1 week ago and 3 days ago he necessitated a walker and yesterday was unable to ambulate stating his legs were giving out on him, patient also reports 3 days history of difficulty controlling his urine, Urgency and inability to get to the bathroom before urinating. Patient was diagnosed with thoracic compression fracture a month ago and had seen Dr. Landon, had scheduled appointment to see him this coming Thursday but came to the ED because symptoms had become so severe. Wife at bedside, denies changes in bowel, States that pain radiates down his left leg and he has intermittent numbness of the LLE.    Patient is status post L4-S1 PSF in 2003 at [facility]    PAST MEDICAL & SURGICAL HISTORY:  Myopathy  Hypercholesteremia  Hypertension     HOSPITAL COURSE:  63 y/o M underwent T6-T11 Laminectomy, posterior spinal fusion on 9/23/18 with Dr.M. Landon.  Patient tolerated procedure well.  Patient was evaluated postoperatively by physical and occupational therapists for weight bearing ambulation and advised that patient would benefit from admission to rehab facility.   Patient advised to keep surgical incision/dressing clean and dry, and have dressing removed  post op day #15.  Patient further advised to follow up with  2-3 weeks post o, patient further advised to follow up wit his Oncologist Dr De Anda once discharged from rehab facility. Reason for Admission: Worsening of Thoracic compression fracture, recurrent falls	  History of Present Illness: 	                                             HPI: Pt is a 62y complains of falls with worsening gait, requiring a cane 1 week ago and 3 days ago he necessitated a walker and yesterday was unable to ambulate stating his legs were giving out on him, patient also reports 3 days history of difficulty controlling his urine, Urgency and inability to get to the bathroom before urinating. Patient was diagnosed with thoracic compression fracture a month ago and had seen Dr. Landon, had scheduled appointment to see him this coming Thursday but came to the ED because symptoms had become so severe. Wife at bedside, denies changes in bowel, States that pain radiates down his left leg and he has intermittent numbness of the LLE.    Patient is status post L4-S1 PSF in 2003 at [facility]    PAST MEDICAL & SURGICAL HISTORY:  Myopathy  Hypercholesteremia  Hypertension     HOSPITAL COURSE:  61 y/o M underwent T6-T11 Laminectomy, posterior spinal fusion on 9/23/18 with Dr.M. Landon.  Patient tolerated procedure well.  Patient was evaluated postoperatively by physical and occupational therapists for weight bearing ambulation and advised that patient would benefit from admission to rehab facility.  Patient however, requesting home with home PT/OT.  Patient advised to keep surgical incision/dressing clean and dry, and have dressing removed  post op day #15.  Patient further advised to follow up with Dr. Landon 2-3 weeks post op, patient further advised to follow up wit his Oncologist Dr De Anda once discharged from rehab facility. Reason for Admission: Worsening of Thoracic compression fracture, recurrent falls	  History of Present Illness: 	                                             HPI: Pt is a 62y complains of falls with worsening gait, requiring a cane 1 week ago and 3 days ago he necessitated a walker and yesterday was unable to ambulate stating his legs were giving out on him, patient also reports 3 days history of difficulty controlling his urine, Urgency and inability to get to the bathroom before urinating. Patient was diagnosed with thoracic compression fracture a month ago and had seen Dr. Landon, had scheduled appointment to see him this coming Thursday but came to the ED because symptoms had become so severe. Wife at bedside, denies changes in bowel, States that pain radiates down his left leg and he has intermittent numbness of the LLE.    Patient is status post L4-S[facility] in [facility]    PAST MEDICAL & SURGICAL HISTORY:  Myopathy  Hypercholesteremia  Hypertension     HOSPITAL COURSE:  63 y/o M underwent T6-T11 Laminectomy, posterior spinal fusion on 9/23/18 with Dr.M. Landon.  Patient tolerated procedure well.  Patient was evaluated postoperatively by physical and occupational therapists for weight bearing ambulation and advised that patient would benefit from admission to an acute rehab facility.  Patient however, requesting home with outpatient PT.  Patient advised to keep surgical incision/dressing clean and dry, and have dressing removed  post op day #15.  Patient further advised to follow up with Dr. Landon 2-3 weeks post op, patient further advised to follow up wit his Oncologist Dr De Anda once discharged from the hospital.  Patient cleared for home with outpatient PT. Reason for Admission: Worsening of Thoracic compression fracture, recurrent falls	  History of Present Illness: 	                                             HPI: Pt is a 62y complains of falls with worsening gait, requiring a cane 1 week ago and 3 days ago he necessitated a walker and yesterday was unable to ambulate stating his legs were giving out on him, patient also reports 3 days history of difficulty controlling his urine, Urgency and inability to get to the bathroom before urinating. Patient was diagnosed with thoracic compression fracture a month ago and had seen Dr. Landon, had scheduled appointment to see him this coming Thursday but came to the ED because symptoms had become so severe. Wife at bedside, denies changes in bowel, States that pain radiates down his left leg and he has intermittent numbness of the LLE.    Patient is status post L4-S1 PSF in 2003 at [facility]    PAST MEDICAL & SURGICAL HISTORY:  Myopathy  Hypercholesteremia  Hypertension     HOSPITAL COURSE:  61 y/o M underwent T6-T11 Laminectomy, posterior spinal fusion on 9/23/18 with Dr.M. Landon.  Patient tolerated procedure well.  Patient was evaluated postoperatively by physical and occupational therapists for weight bearing ambulation and advised that patient would benefit from admission to an acute rehab facility.  Patient however, requesting home with outpatient PT.  Patient advised to keep surgical incision/dressing clean and dry, and have dressing removed  post op day #15.  Patient further advised to follow up with Dr. Landon 1-2 weeks post op, patient further advised to follow up wit his Oncologist Dr De Anda, and Primary Care Physician once discharged from the hospital.  Patient cleared for home with outpatient PT.

## 2018-09-28 NOTE — DISCHARGE NOTE ADULT - ADDITIONAL INSTRUCTIONS
Keep surgical incision/dressing clean and dry. Have dressing removed post operative day #15 (10/8/18). continue weight bearing as tolerated ambulation/physical therapy.

## 2018-09-28 NOTE — DISCHARGE NOTE ADULT - PLAN OF CARE
surgical intervention should result in improved function continue weight bearing as tolerated ambulation/physical therapy

## 2018-09-28 NOTE — DISCHARGE NOTE ADULT - HOME CARE AGENCY
[facility] [phone] FOR VN AND PT EVALUATIONS. VISITING NURSE WILL CALL TO SCHEDULE VISIT DAY AFTER DISCHARGE.

## 2018-09-28 NOTE — DISCHARGE NOTE ADULT - CARE PLAN
Principal Discharge DX:	Cord compression  Goal:	surgical intervention should result in improved function  Assessment and plan of treatment:	continue weight bearing as tolerated ambulation/physical therapy

## 2018-09-29 RX ORDER — DIPHENHYDRAMINE HCL 50 MG
50 CAPSULE ORAL EVERY 6 HOURS
Qty: 0 | Refills: 0 | Status: DISCONTINUED | OUTPATIENT
Start: 2018-09-29 | End: 2018-10-01

## 2018-09-29 RX ADMIN — OXYCODONE HYDROCHLORIDE 5 MILLIGRAM(S): 5 TABLET ORAL at 14:05

## 2018-09-29 RX ADMIN — OXYCODONE HYDROCHLORIDE 5 MILLIGRAM(S): 5 TABLET ORAL at 05:50

## 2018-09-29 RX ADMIN — OXYCODONE HYDROCHLORIDE 5 MILLIGRAM(S): 5 TABLET ORAL at 17:27

## 2018-09-29 RX ADMIN — OXYCODONE HYDROCHLORIDE 5 MILLIGRAM(S): 5 TABLET ORAL at 06:20

## 2018-09-29 RX ADMIN — SENNA PLUS 2 TABLET(S): 8.6 TABLET ORAL at 21:17

## 2018-09-29 RX ADMIN — OXYCODONE HYDROCHLORIDE 5 MILLIGRAM(S): 5 TABLET ORAL at 13:34

## 2018-09-29 RX ADMIN — OXYCODONE HYDROCHLORIDE 5 MILLIGRAM(S): 5 TABLET ORAL at 21:20

## 2018-09-29 RX ADMIN — Medication 50 MILLIGRAM(S): at 09:42

## 2018-09-29 RX ADMIN — OXYCODONE HYDROCHLORIDE 5 MILLIGRAM(S): 5 TABLET ORAL at 00:13

## 2018-09-29 RX ADMIN — TAMSULOSIN HYDROCHLORIDE 0.4 MILLIGRAM(S): 0.4 CAPSULE ORAL at 21:17

## 2018-09-29 RX ADMIN — ENOXAPARIN SODIUM 40 MILLIGRAM(S): 100 INJECTION SUBCUTANEOUS at 17:27

## 2018-09-29 RX ADMIN — DULOXETINE HYDROCHLORIDE 60 MILLIGRAM(S): 30 CAPSULE, DELAYED RELEASE ORAL at 12:19

## 2018-09-29 RX ADMIN — OXYCODONE HYDROCHLORIDE 5 MILLIGRAM(S): 5 TABLET ORAL at 18:00

## 2018-09-29 RX ADMIN — OXYCODONE HYDROCHLORIDE 5 MILLIGRAM(S): 5 TABLET ORAL at 21:50

## 2018-09-29 RX ADMIN — Medication 50 MILLIGRAM(S): at 21:18

## 2018-09-29 RX ADMIN — Medication 100 MILLIGRAM(S): at 21:17

## 2018-09-29 NOTE — OCCUPATIONAL THERAPY INITIAL EVALUATION ADULT - VISUAL ASSESSMENT: VISUAL NEGLECT
From: Terri Gotti  To: Jeanie Palmer PA-C  Sent: 8/8/2018 8:51 AM CDT  Subject: Adam Krishnamurthy,  They told me it must have been called in somewhere else and it was filled. That I am not able to  until 8/23.  Terri   none

## 2018-09-29 NOTE — OCCUPATIONAL THERAPY INITIAL EVALUATION ADULT - ADL RETRAINING, OT EVAL
Patient will dress lower body independently AE as needed in 2 weeks. Patient will dress upper body, including TLSO brace independently in 2 weeks

## 2018-09-29 NOTE — PROGRESS NOTE ADULT - SUBJECTIVE AND OBJECTIVE BOX
HPI:  CHIEF COMPLAINT:     HPI: Pt is a 62y complains of falls with worsening gait, requiring a cane 1 week ago and 3 days ago he necessitated a walker and yesterday was unable to ambulate stating his legs were giving out on him, patient also reports 3 days history of difficulty controlling his urine, Urgency and inability to get to the bathroom before urinating. Patient was diagnosed with thoracic compression fracture a month ago and had seen Dr. Landon, had scheduled appointment to see him this coming Thursday but came to the ED because symptoms had become so severe. Wife at bedside, denies changes in bowel, States that pain radiates down his left leg and he has intermittent numbness of the LLE.    Patient is status post L4-S1 PSF in 2003 at [facility]      PAST MEDICAL & SURGICAL HISTORY:  Myopathy  Hypercholesteremia  Hypertension    Vital Signs Last 24 Hrs  T(C): 37 (22 Sep 2018 21:26), Max: 37 (22 Sep 2018 21:26)  T(F): 98.6 (22 Sep 2018 21:26), Max: 98.6 (22 Sep 2018 21:26)  HR: 68 (22 Sep 2018 21:26) (68 - 85)  BP: 145/89 (22 Sep 2018 21:26) (128/85 - 145/89)  BP(mean): --  RR: 16 (22 Sep 2018 21:26) (16 - 20)  SpO2: 98% (22 Sep 2018 21:26) (97% - 98%)    I&O's Detail  LABS:                      14.8   7.1   )-----------( 244      ( 22 Sep 2018 22:52 )             40.4     09-22  140  |  100  |  14  ----------------------------<  95  4.1   |  28  |  1.00    Ca    10.4      22 Sep 2018 22:52  TPro  7.1  /  Alb  4.0  /  TBili  0.7  /  DBili  x   /  AST  22  /  ALT  20  /  AlkPhos  64  09-22  PT/INR - ( 22 Sep 2018 22:52 )   PT: 11.9 sec;   INR: 1.09 ratio    PTT - ( 22 Sep 2018 22:52 )  PTT:34.6 sec    RADIOLOGY & ADDITIONAL STUDIES:    PHYSICAL EXAM:  General; Awake and alert, Oriented x 3  Hips/Pelvis:   ABle to SLR bilaterally. Negative log roll, heel strike. No pain on passive Int/Ext hip rotation.  Spine exam:  Neck: supple, NT, Full Painless baseline AROM  Back: TTP Thoracic spine  Spine:                       Motor exam:          Right Upper extremity: Delt 5/5, Biceps 5/5, Triceps 5/5, Wrist Ext 5/5, Wrist Flexion 5/5,  Strength 5/5         SILT C5-S1, No Clonus, Negative Chnug, +RP, Compartments soft           Left Upper extremity: Delt 5/5, Biceps 5/5, Triceps 5/5, Wrist Ext 5/5, Wrist Flexion 5/5,  Strength 5/5         SILT C5-S1, No Clonus, Negative Chung, +RP, Compartments soft           Right Lower Extremity: Hyperreflexive patella tendon reflex 3+, Hip Flexion 4/5, Hip Extension 4/5, Knee Flexion 4/5, Knee Extension 4/5, Ankle Dorsiflexion 5/5, Ankle Plantar Flexion 5/5, Extensor hallucis Longus 4/5         SILT L2-S1, No pain with SLR, Negative Clonus, Negative Babinski                  Left Lower Extremity: Hyperreflexive patella tendon reflex 3+, Hip Flexion 4/5, Hip Extension 4/5, Knee Flexion 4/5, Knee Extension 4/5, Ankle Dorsiflexion 5/5,  Ankle Plantar Flexion 5/5, Extensor hallucis Longus 4/5         Decreased sensation compared with contralateral side L3/4/5,  No pain with SLR, Negative Clonus, Negative Babinski (23 Sep 2018 00:50)    PAST MEDICAL & SURGICAL HISTORY:  BPH (benign prostatic hyperplasia): s/p TURP not on Flomax  Myopathy  Hypercholesteremia  Hypertension  Lumbar spinal stenosis: s/p L4-S1 PSF 2003  S/P repair of hydrocele: 2008  Hemorrhoids, unspecified hemorrhoid type  Disorder of thymus gland: s/p removal 1992  S/P TURP (status post transurethral resection of prostate): october 2017    Medications:  acetaminophen   Tablet .. 975 milliGRAM(s) Oral every 6 hours PRN Mild Pain (1 - 3)  diazepam    Tablet 5 milliGRAM(s) Oral two times a day PRN back spasms  diphenhydrAMINE   Capsule 50 milliGRAM(s) Oral every 6 hours PRN Rash and/or Itching  docusate sodium 100 milliGRAM(s) Oral three times a day  DULoxetine 60 milliGRAM(s) Oral daily  enoxaparin Injectable 40 milliGRAM(s) SubCutaneous every 24 hours  influenza   Vaccine 0.5 milliLiter(s) IntraMuscular once  oxyCODONE    IR 5 milliGRAM(s) Oral every 4 hours PRN Moderate Pain (4 - 6)  polyethylene glycol 3350 17 Gram(s) Oral daily  senna 2 Tablet(s) Oral at bedtime  tamsulosin 0.4 milliGRAM(s) Oral at bedtime  traMADol 50 milliGRAM(s) Oral every 6 hours PRN Severe Pain (7 - 10)    Labs:            Radiology:             ROS:  Patient comfortable without distress  No SOB or chest pain  No palpitation  No abdominal pain, diarrhaea or constipation  No weakness of extremities  No skin changes or swelling of legs    Vital Signs Last 24 Hrs  T(C): 36.9 (29 Sep 2018 12:56), Max: 37.1 (28 Sep 2018 16:04)  T(F): 98.5 (29 Sep 2018 12:56), Max: 98.8 (28 Sep 2018 16:04)  HR: 75 (29 Sep 2018 12:56) (68 - 79)  BP: 112/72 (29 Sep 2018 12:56) (109/69 - 121/72)  BP(mean): --  RR: 18 (29 Sep 2018 12:56) (18 - 18)  SpO2: 98% (29 Sep 2018 12:56) (93% - 98%)    Physical exam:  Patient alert and oriented  No distress  CVS: S1, S2 regular or murmur  Chest: bilateral breath sound without rales  Abdomen: soft, not tender, no organomegaly or masses  No focal neuro deficit  No edema      Assessment and Plan: HPI:  Pt is a 62y admitted with complains of falls with worsening gait, requiring a cane 1 week ago and 3 days ago he necessitated a walker and yesterday was unable to ambulate stating his legs were giving out on him, he reported 3 days history of difficulty controlling his urine, Urgency and inability to get to the bathroom before urinating. Patient was diagnosed with thoracic compression fracture a month ago and had seen Dr. Landon, and had w/u at our office which did not show any paraproteinemia  Patient is status post L4-S1 PSF in 2003 at [facility]          PAST MEDICAL & SURGICAL HISTORY:  BPH (benign prostatic hyperplasia): s/p TURP not on Flomax  Myopathy  Hypercholesteremia  Hypertension  Lumbar spinal stenosis: s/p L4-S1 PSF 2003  S/P repair of hydrocele: 2008  Hemorrhoids, unspecified hemorrhoid type  Disorder of thymus gland: s/p removal 1992  S/P TURP (status post transurethral resection of prostate): october 2017    Medications:  acetaminophen   Tablet .. 975 milliGRAM(s) Oral every 6 hours PRN Mild Pain (1 - 3)  diazepam    Tablet 5 milliGRAM(s) Oral two times a day PRN back spasms  diphenhydrAMINE   Capsule 50 milliGRAM(s) Oral every 6 hours PRN Rash and/or Itching  docusate sodium 100 milliGRAM(s) Oral three times a day  DULoxetine 60 milliGRAM(s) Oral daily  enoxaparin Injectable 40 milliGRAM(s) SubCutaneous every 24 hours  influenza   Vaccine 0.5 milliLiter(s) IntraMuscular once  oxyCODONE    IR 5 milliGRAM(s) Oral every 4 hours PRN Moderate Pain (4 - 6)  polyethylene glycol 3350 17 Gram(s) Oral daily  senna 2 Tablet(s) Oral at bedtime  tamsulosin 0.4 milliGRAM(s) Oral at bedtime  traMADol 50 milliGRAM(s) Oral every 6 hours PRN Severe Pain (7 - 10)    Labs:            Radiology:             ROS:  Patient comfortable without distress  No SOB or chest pain  No palpitation  No abdominal pain, diarrhaea or constipation  Wearing brace  Says is walking but is little unsteady      Vital Signs Last 24 Hrs  T(C): 36.9 (29 Sep 2018 12:56), Max: 37.1 (28 Sep 2018 16:04)  T(F): 98.5 (29 Sep 2018 12:56), Max: 98.8 (28 Sep 2018 16:04)  HR: 75 (29 Sep 2018 12:56) (68 - 79)  BP: 112/72 (29 Sep 2018 12:56) (109/69 - 121/72)  BP(mean): --  RR: 18 (29 Sep 2018 12:56) (18 - 18)  SpO2: 98% (29 Sep 2018 12:56) (93% - 98%)    Physical exam:  Patient alert and oriented  No distress  CVS: S1, S2 regular or murmur  Has brace for spine, still has drains  Abdomen: soft, not tender, no organomegaly or masses  No focal neuro deficit  No edema      Assessment and Plan:

## 2018-09-29 NOTE — PROGRESS NOTE ADULT - SUBJECTIVE AND OBJECTIVE BOX
Patient is a 62y old  Male who presents with a chief complaint of Worsening of Thoracic compression fracture, recurrent falls  Patient s/p T6-11 Laminectomy, posterior spinal fusion  POST OPERATIVE DAY #:  [6 ]   Patient comfortable, c/o urinary frequency      T(C): 36.3 (09-29-18 @ 05:08), Max: 37.1 (09-28-18 @ 16:04)  HR: 68 (09-29-18 @ 05:08) (68 - 79)  BP: 112/72 (09-29-18 @ 05:08) (105/70 - 121/72)  RR: 18 (09-29-18 @ 05:08) (18 - 18)  SpO2: 94% (09-29-18 @ 05:08) (94% - 97%)    PHYSICAL EXAM:  NAD, Alert  Back: Dressing C/D/I; Left ABDIAS 17/17cc, Right 60/120cc             [ ] Lower extremeity                  PF          DF         EHL       FHL                                                                                            R        5/5        5/5        5/5       5/5                                                        L         5/5        5/5        5/5       5/5                       12.4   8.11  )-----------( 193      ( 27 Sep 2018 09:48 )             35.2   09-27  139  |  100  |  12  ----------------------------<  87  3.9   |  30  |  0.88    Ca    9.3      27 Sep 2018 07:15  Phos  2.9     09-27  Mg     1.9     09-27

## 2018-09-29 NOTE — OCCUPATIONAL THERAPY INITIAL EVALUATION ADULT - DIAGNOSIS, OT EVAL
Patient with decreased ADL status and impairments with functional mobility due to Patient with decreased ADL status and impairments with functional mobility due to decreased ROM, strength, balance, and coordination

## 2018-09-29 NOTE — OCCUPATIONAL THERAPY INITIAL EVALUATION ADULT - ADDITIONAL COMMENTS
CT Thoracic spine 9/23- Metastatic pathologic compression fracture deformity of the T9 vertebra with 3 column involvement, loss of 80% of height epidural and prevertebral extension, and extension to the bilateral T8-9 and T9-10 neural foramina.

## 2018-09-29 NOTE — OCCUPATIONAL THERAPY INITIAL EVALUATION ADULT - RANGE OF MOTION EXAMINATION, UPPER EXTREMITY
bilateral UE Active ROM was WFL  (within functional limits)/shoulder flexion 0-90 due to spine precautions

## 2018-09-29 NOTE — OCCUPATIONAL THERAPY INITIAL EVALUATION ADULT - PERTINENT HX OF CURRENT PROBLEM, REHAB EVAL
Pt is a 62y complains of falls with worsening gait, requiring a cane 1 week ago and 3 days ago he necessitated a walker and yesterday was unable to ambulate stating his legs were giving out on him, patient also reports 3 days history of difficulty controlling his urine, Urgency and inability to get to the bathroom before urinating.

## 2018-09-29 NOTE — OCCUPATIONAL THERAPY INITIAL EVALUATION ADULT - LIVES WITH, PROFILE
Pt lives in a pvt home with family. 2 steps to enter basement level. +Stall shower. Owns rolling walker, straight cane, and shower chair/spouse/children

## 2018-09-29 NOTE — OCCUPATIONAL THERAPY INITIAL EVALUATION ADULT - PRECAUTIONS/LIMITATIONS, REHAB EVAL
fall precautions/spinal precautions/surgical precautions/Patient was diagnosed with thoracic compression fracture a month ago and had seen Dr. Landon, had scheduled appointment to see him this coming Thursday but came to the ED because symptoms had become so severe. Wife at bedside, denies changes in bowel, States that pain radiates down his left leg and he has intermittent numbness of the LLE.

## 2018-09-30 RX ADMIN — Medication 50 MILLIGRAM(S): at 11:54

## 2018-09-30 RX ADMIN — OXYCODONE HYDROCHLORIDE 5 MILLIGRAM(S): 5 TABLET ORAL at 12:30

## 2018-09-30 RX ADMIN — Medication 50 MILLIGRAM(S): at 21:16

## 2018-09-30 RX ADMIN — OXYCODONE HYDROCHLORIDE 5 MILLIGRAM(S): 5 TABLET ORAL at 11:55

## 2018-09-30 RX ADMIN — Medication 50 MILLIGRAM(S): at 05:37

## 2018-09-30 RX ADMIN — SENNA PLUS 2 TABLET(S): 8.6 TABLET ORAL at 21:16

## 2018-09-30 RX ADMIN — OXYCODONE HYDROCHLORIDE 5 MILLIGRAM(S): 5 TABLET ORAL at 05:37

## 2018-09-30 RX ADMIN — OXYCODONE HYDROCHLORIDE 5 MILLIGRAM(S): 5 TABLET ORAL at 21:46

## 2018-09-30 RX ADMIN — TAMSULOSIN HYDROCHLORIDE 0.4 MILLIGRAM(S): 0.4 CAPSULE ORAL at 21:16

## 2018-09-30 RX ADMIN — Medication 100 MILLIGRAM(S): at 05:37

## 2018-09-30 RX ADMIN — OXYCODONE HYDROCHLORIDE 5 MILLIGRAM(S): 5 TABLET ORAL at 21:16

## 2018-09-30 RX ADMIN — Medication 975 MILLIGRAM(S): at 14:27

## 2018-09-30 RX ADMIN — Medication 100 MILLIGRAM(S): at 21:16

## 2018-09-30 RX ADMIN — OXYCODONE HYDROCHLORIDE 5 MILLIGRAM(S): 5 TABLET ORAL at 06:07

## 2018-09-30 RX ADMIN — Medication 975 MILLIGRAM(S): at 15:00

## 2018-09-30 RX ADMIN — ENOXAPARIN SODIUM 40 MILLIGRAM(S): 100 INJECTION SUBCUTANEOUS at 17:28

## 2018-09-30 RX ADMIN — DULOXETINE HYDROCHLORIDE 60 MILLIGRAM(S): 30 CAPSULE, DELAYED RELEASE ORAL at 11:55

## 2018-09-30 NOTE — PROGRESS NOTE ADULT - SUBJECTIVE AND OBJECTIVE BOX
Patient is a 62y old  Male who presents with a chief complaint of Worsening of Thoracic compression fracture, recurrent falls  Patient s/p T6-11 Lami/PSF  POST OPERATIVE DAY #:  [7 ]   Patient comfortable  No complaints    T(C): 37.1 (09-30-18 @ 04:50), Max: 37.1 (09-30-18 @ 04:50)  HR: 67 (09-30-18 @ 04:50) (64 - 80)  BP: 107/67 (09-30-18 @ 04:50) (105/65 - 112/72)  RR: 18 (09-30-18 @ 04:50) (16 - 18)  SpO2: 94% (09-30-18 @ 04:50) (93% - 98%)    PHYSICAL EXAM:  NAD, Alert  Back: Dressing C/D/I; sensation grossly intact to light touch; (+) Distal Pulses; No Calf tenderness B/L, PAS              [ ] Lower extremeity                  PF          DF         EHL       FHL                                                                                            R        5/5        5/5        5/5       5/5                                                        L         5/5        5/5        5/5       5/5

## 2018-10-01 VITALS
DIASTOLIC BLOOD PRESSURE: 82 MMHG | RESPIRATION RATE: 18 BRPM | SYSTOLIC BLOOD PRESSURE: 124 MMHG | TEMPERATURE: 99 F | HEART RATE: 73 BPM | OXYGEN SATURATION: 94 %

## 2018-10-01 PROCEDURE — 88271 CYTOGENETICS DNA PROBE: CPT

## 2018-10-01 PROCEDURE — 82330 ASSAY OF CALCIUM: CPT

## 2018-10-01 PROCEDURE — 72141 MRI NECK SPINE W/O DYE: CPT

## 2018-10-01 PROCEDURE — C1713: CPT

## 2018-10-01 PROCEDURE — 99285 EMERGENCY DEPT VISIT HI MDM: CPT

## 2018-10-01 PROCEDURE — 97530 THERAPEUTIC ACTIVITIES: CPT

## 2018-10-01 PROCEDURE — 90686 IIV4 VACC NO PRSV 0.5 ML IM: CPT

## 2018-10-01 PROCEDURE — 84295 ASSAY OF SERUM SODIUM: CPT

## 2018-10-01 PROCEDURE — 72148 MRI LUMBAR SPINE W/O DYE: CPT

## 2018-10-01 PROCEDURE — 88275 CYTOGENETICS 100-300: CPT

## 2018-10-01 PROCEDURE — 76000 FLUOROSCOPY <1 HR PHYS/QHP: CPT

## 2018-10-01 PROCEDURE — 82803 BLOOD GASES ANY COMBINATION: CPT

## 2018-10-01 PROCEDURE — 81001 URINALYSIS AUTO W/SCOPE: CPT

## 2018-10-01 PROCEDURE — 86850 RBC ANTIBODY SCREEN: CPT

## 2018-10-01 PROCEDURE — 84132 ASSAY OF SERUM POTASSIUM: CPT

## 2018-10-01 PROCEDURE — 97116 GAIT TRAINING THERAPY: CPT

## 2018-10-01 PROCEDURE — 97166 OT EVAL MOD COMPLEX 45 MIN: CPT

## 2018-10-01 PROCEDURE — 82435 ASSAY OF BLOOD CHLORIDE: CPT

## 2018-10-01 PROCEDURE — 84100 ASSAY OF PHOSPHORUS: CPT

## 2018-10-01 PROCEDURE — C1889: CPT

## 2018-10-01 PROCEDURE — 88184 FLOWCYTOMETRY/ TC 1 MARKER: CPT

## 2018-10-01 PROCEDURE — 85610 PROTHROMBIN TIME: CPT

## 2018-10-01 PROCEDURE — 72128 CT CHEST SPINE W/O DYE: CPT

## 2018-10-01 PROCEDURE — 82947 ASSAY GLUCOSE BLOOD QUANT: CPT

## 2018-10-01 PROCEDURE — 86900 BLOOD TYPING SEROLOGIC ABO: CPT

## 2018-10-01 PROCEDURE — 85027 COMPLETE CBC AUTOMATED: CPT

## 2018-10-01 PROCEDURE — 72157 MRI CHEST SPINE W/O & W/DYE: CPT

## 2018-10-01 PROCEDURE — 88185 FLOWCYTOMETRY/TC ADD-ON: CPT

## 2018-10-01 PROCEDURE — 80053 COMPREHEN METABOLIC PANEL: CPT

## 2018-10-01 PROCEDURE — 86901 BLOOD TYPING SEROLOGIC RH(D): CPT

## 2018-10-01 PROCEDURE — 88341 IMHCHEM/IMCYTCHM EA ADD ANTB: CPT

## 2018-10-01 PROCEDURE — 83735 ASSAY OF MAGNESIUM: CPT

## 2018-10-01 PROCEDURE — 85014 HEMATOCRIT: CPT

## 2018-10-01 PROCEDURE — 93005 ELECTROCARDIOGRAM TRACING: CPT

## 2018-10-01 PROCEDURE — 88360 TUMOR IMMUNOHISTOCHEM/MANUAL: CPT

## 2018-10-01 PROCEDURE — A9585: CPT

## 2018-10-01 PROCEDURE — 80048 BASIC METABOLIC PNL TOTAL CA: CPT

## 2018-10-01 PROCEDURE — 71045 X-RAY EXAM CHEST 1 VIEW: CPT

## 2018-10-01 PROCEDURE — 88342 IMHCHEM/IMCYTCHM 1ST ANTB: CPT

## 2018-10-01 PROCEDURE — 88307 TISSUE EXAM BY PATHOLOGIST: CPT

## 2018-10-01 PROCEDURE — 97162 PT EVAL MOD COMPLEX 30 MIN: CPT

## 2018-10-01 PROCEDURE — 88365 INSITU HYBRIDIZATION (FISH): CPT

## 2018-10-01 PROCEDURE — 72100 X-RAY EXAM L-S SPINE 2/3 VWS: CPT

## 2018-10-01 PROCEDURE — 72040 X-RAY EXAM NECK SPINE 2-3 VW: CPT

## 2018-10-01 PROCEDURE — 72070 X-RAY EXAM THORAC SPINE 2VWS: CPT

## 2018-10-01 PROCEDURE — 88237 TISSUE CULTURE BONE MARROW: CPT

## 2018-10-01 PROCEDURE — 88331 PATH CONSLTJ SURG 1 BLK 1SPC: CPT

## 2018-10-01 PROCEDURE — 85730 THROMBOPLASTIN TIME PARTIAL: CPT

## 2018-10-01 PROCEDURE — C1769: CPT

## 2018-10-01 PROCEDURE — 83605 ASSAY OF LACTIC ACID: CPT

## 2018-10-01 RX ORDER — OXYCODONE HYDROCHLORIDE 5 MG/1
5 TABLET ORAL EVERY 4 HOURS
Qty: 0 | Refills: 0 | Status: DISCONTINUED | OUTPATIENT
Start: 2018-10-01 | End: 2018-10-01

## 2018-10-01 RX ORDER — ENOXAPARIN SODIUM 100 MG/ML
40 INJECTION SUBCUTANEOUS
Qty: 840 | Refills: 0 | OUTPATIENT
Start: 2018-10-01 | End: 2018-10-21

## 2018-10-01 RX ORDER — TAMSULOSIN HYDROCHLORIDE 0.4 MG/1
1 CAPSULE ORAL
Qty: 30 | Refills: 0 | OUTPATIENT
Start: 2018-10-01 | End: 2018-10-30

## 2018-10-01 RX ORDER — SENNA PLUS 8.6 MG/1
2 TABLET ORAL
Qty: 0 | Refills: 0 | COMMUNITY
Start: 2018-10-01

## 2018-10-01 RX ORDER — DOCUSATE SODIUM 100 MG
1 CAPSULE ORAL
Qty: 0 | Refills: 0 | COMMUNITY
Start: 2018-10-01

## 2018-10-01 RX ORDER — ACETAMINOPHEN 500 MG
3 TABLET ORAL
Qty: 0 | Refills: 0 | COMMUNITY
Start: 2018-10-01

## 2018-10-01 RX ORDER — OXYCODONE HYDROCHLORIDE 5 MG/1
1 TABLET ORAL
Qty: 60 | Refills: 0 | OUTPATIENT
Start: 2018-10-01

## 2018-10-01 RX ADMIN — DULOXETINE HYDROCHLORIDE 60 MILLIGRAM(S): 30 CAPSULE, DELAYED RELEASE ORAL at 11:34

## 2018-10-01 RX ADMIN — Medication 975 MILLIGRAM(S): at 05:57

## 2018-10-01 RX ADMIN — OXYCODONE HYDROCHLORIDE 5 MILLIGRAM(S): 5 TABLET ORAL at 12:03

## 2018-10-01 RX ADMIN — Medication 975 MILLIGRAM(S): at 05:27

## 2018-10-01 RX ADMIN — OXYCODONE HYDROCHLORIDE 5 MILLIGRAM(S): 5 TABLET ORAL at 11:33

## 2018-10-01 RX ADMIN — INFLUENZA VIRUS VACCINE 0.5 MILLILITER(S): 15; 15; 15; 15 SUSPENSION INTRAMUSCULAR at 11:48

## 2018-10-01 RX ADMIN — Medication 100 MILLIGRAM(S): at 05:27

## 2018-10-01 RX ADMIN — Medication 50 MILLIGRAM(S): at 05:26

## 2018-10-01 NOTE — CHART NOTE - NSCHARTNOTEFT_GEN_A_CORE
Spoke with Dr. Landon, would like patient to continue Lovenox x 3 more weeks.  Rx sent to [facility].    QUIN Lloyd PA-C  #[id number]

## 2018-10-01 NOTE — PROGRESS NOTE ADULT - PROVIDER SPECIALTY LIST ADULT
Heme/Onc
Heme/Onc
Hospitalist
Orthopedics
SICU
Heme/Onc

## 2018-10-01 NOTE — PROGRESS NOTE ADULT - REASON FOR ADMISSION
T9 pathologic fracture, myelopathy
Worsening of Thoracic compression fracture, recurrent falls

## 2018-10-01 NOTE — PROGRESS NOTE ADULT - ASSESSMENT
61 yo man with pathologic fracture of T9 with epidural tumor extension and severe cord compression with cord edema s/p thoracic T6-T11 laminectomy and posterior spinal fusion 9/23.    PLAN:   Neurologic: T6-T11 laminectomy and posterior spinal fusion, acute pain.  - q1 neurovascular checks  - Tylenol and oxycodone prn for pain  - TLSO when out of bed and transferring. Ok for OOB to chair without brace.    Respiratory: no acute issues  - Incentive spirometry, OOB to prevent atelectasis    Cardiovascular: no acute issues. goal MAP 75 for 24 hrs post-op.  - cont to monitor    Gastrointestinal/Nutrition: no acute issues  - Regular diet  - Bowel regimen    Genitourinary/Renal: no acute issues  - Santana for I&Os, D/C in AM  - LR @ 50ml/hr, IV lock when tolerating diet    Hematologic: H/H stable, no indication for transfusion.  - DVT PPx: lovenox 40 mg daily  - Venodynes, OOB     Infectious Disease: afebrile, resolved leukocytosis. perioperative prophylaxis per ortho spine.  - Cefazolin prophylaxis while ABDIAS drains in place    Endocrine: mildly elevated BG likely secondary to SIRS response.  - Monitor glucose on BMP    Disposition: SICU. Full code.
63 Y/O M,  s/p T6-11 Laminectomy, posterior spinal fusion POD#6, physical therapy, acute rehab when bed availble  Hetal Chacko PA-C  Orthopaedic Surgery  Team pager [phone]  [facility] [phone]  [phone]
63 y/o M s/p T6-11 Laminectomy, PSF POD#7, as per Dr Landon d/c JPs today, physical therapy, acute rehab 10/1/18  Hetal Chacko PA-C  Orthopaedic Surgery  Team pager [phone]  [facility] [phone]  [phone]
63 yo man with pathologic fracture of T9 with epidural tumor extension and severe cord compression with cord edema s/p thoracic T6-T11 laminectomy and posterior spinal fusion 9/23.    PLAN:   Neurologic: T6-T11 laminectomy and posterior spinal fusion, acute pain.  - Tylenol and oxycodone prn for pain  - TLSO when out of bed and transferring. Ok for OOB to chair without brace.    Respiratory: no acute issues  - Incentive spirometry, OOB to prevent atelectasis    Cardiovascular: no acute issues. goal MAP 75 for 24 hrs post-op.  - cont to monitor    Gastrointestinal/Nutrition: no acute issues  - Regular diet  - Bowel regimen    Genitourinary/Renal: no acute issues  - Santana replaced for retention    Hematologic: H/H stable, no indication for transfusion.  - DVT PPx: lovenox 40 mg daily  - Venodynes, OOB     Infectious Disease: afebrile, resolved leukocytosis. perioperative prophylaxis per ortho spine.  - Cefazolin prophylaxis while ABDAIS drains in place    Endocrine: mildly elevated BG likely secondary to SIRS response.  - Monitor glucose on BMP    Disposition: SICU. Full code.
A/P: 61 y/o M POD# 4 S/P T6-11 Lami/PSF       DVT ppx - Lovenox   ABDIAS drains to suction  Pain management prn  D/C maria today  Regular diet  Ambulation  D/W attending    DC Alicia  Orthopedic Surgery  [phone]
A/P: 61 y/o M POD#5 S/P T6-11 Lami/PSF   DVT ppx - Lovenox   ABDIAS drains to suction, possible removal of one drain today  Pain management prn  Regular diet  PT/OOB  PMNR eval  Dispo planning
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 and severe cord compression with cord edema s/p thoracic T6-T11 laminectomy and posterior spinal fusion 9/23    PLAN:   Neurologic: T6-T11 laminectomy and posterior spinal fusion, acute pain  - q1 neurovascular checks  - Tylenol and oxycodone prn for pain  - PT WBAT, ambulate only with TLSO brace on    Respiratory: no acute issues  - Incentive spirometry, OOB to prevent atelectasis    Cardiovascular: no acute issues  - MAP goal >75 mmHg as per orthospine    Gastrointestinal/Nutrition: no acute issues  - Regular diet  - Bowel regimen    Genitourinary/Renal: no acute issues  - Santana for I&Os, D/C in AM  - LR @ 50ml/hr, IV lock when tolerating diet    Hematologic: Needs VTE prophylaxis  - Start Lovenox 40mg SC qd 24 hours post-operatively as per orthospine  - Venodynes, OOB     Infectious Disease: perioperative prophylaxis  - Cefazolin prophylaxis while ABDIAS drains in place as per orthospine    Endocrine: no acute issues  - Monitor glucose on BMP    Disposition: SICU. Full code.    -Annette Shane PA-C  [phone]
Impression: Stable       Plan:   Continue present treatment                 Out of bed, ambulate with brace                  Physical therapy follow up                  Continue to monitor      Jeb Fraire PA-C  Orthopaedic Surgery  Team pager [phone]  [phone]
Patient had back pain since March 2018. Previous MRI had T9 compression fracture with adjacent soft tissue swelling. On current admission, repeat MRI showed progressive deterioration/moth eaten appearance of T9 with epidural tumor extension and severe cord compression with cord edema. Pt was taken to the OR emergently with orthospine on 9/23/18 and is now s/p thoracic laminectomy with T6-11 posterior spinal fusion.  He is recovering well from surgery and says legs are better than before.   Workup in office had not shown paraproteinemia.  Flowcytometry sent at the time of surgery suggests large B-cell lymphoma. Will await final path.  I informed patient. Once the diagnosis of lymphoma is confirmed, staging workup will be completed and plann for chemotherapeutic management made. Patient understood that that it more treatable than many of the solid malignancies, and hence better prognostically  In the mean time management as per SICU will continue
Pt is a 62 male to ER with complaints of falls with worsening gait, requiring a cane 1 week ago and 3 days ago he necessitated a walker and yesterday was unable to ambulate stating his legs were giving out on him, patient also reports 3 days history of difficulty controlling his urine, urgency and inability to get to the bathroom before urinating. Patient was diagnosed with thoracic compression fracture a month ago and had seen Dr. Landon, had scheduled appointment to see him this coming Thursday but came to the ED because symptoms had become so severe. Wife at bedside, denies changes in bowel, States that pain radiates down his left leg and he has intermittent numbness of the LLE.    Plan: S/P  T-6-12 lumbar spinal laminectomey with spinal fusion. CBC and SMA-7 WNL. CXR clear. Heme notes reviewed in full.  No active infection.   UA with RBC likely from recent maria catheter use, removed this AM for TOV.  Agree with Flomax .4 mg/day added.      Agree Cymbalta 60 mg/day for depression and pain control.     Continue to hold Avapro as BP controlled off meds.     Lovenox 40 mg/day for DVT prevention.     PT and mobilization with proper pain control.      Agree with inpatient rehab tomorrow, once surgical drains are removed.     Will sign off, reconsult as needed.
Patient had back pain since March 2018. Previous MRI had T9 compression fracture with adjacent soft tissue swelling. On current admission, repeat MRI showed progressive deterioration/moth eaten appearance of T9 with epidural tumor extension and severe cord compression with cord edema. Pt was taken to the OR emergently with orthospine on 9/23/18 and is now s/p thoracic laminectomy with T6-11 posterior spinal fusion.  He is recovering well from surgery and says legs are better than before.   Workup in office had not shown paraproteinemia.  Pathology showed DLBCL  Once discharged and after rehab will need PET/CT, bone marrow and then chemotherapy
62 year old man with newly diagnosed extranodal DLBCL presenting as compression fracture. Scans done PTA revealed no adenopathy or organomegaly.

## 2018-10-01 NOTE — PROGRESS NOTE ADULT - SUBJECTIVE AND OBJECTIVE BOX
ORTHO  Patient is a 62y old  Male who presents with a chief complaint of Worsening of Thoracic compression fracture, recurrent falls (30 Sep 2018 07:57)    Pt. resting without complaint    VS-  T(C): 37 (10-01-18 @ 04:44), Max: 37 (10-01-18 @ 04:44)  HR: 73 (10-01-18 @ 04:44) (69 - 89)  BP: 124/82 (10-01-18 @ 04:44) (109/73 - 125/75)  RR: 18 (10-01-18 @ 04:44) (16 - 18)  SpO2: 94% (10-01-18 @ 04:44) (93% - 95%)  Wt(kg): --    M.S. A&O    Lower back-dressings C/D/I  Neuro-              Motor- (+)Ankle DF/PF              Sensation- grossly intact to light touch              Calves- soft, nontender

## 2018-10-04 LAB — CHROM ANALY OVERALL INTERP SPEC-IMP: SIGNIFICANT CHANGE UP

## 2018-10-16 PROBLEM — G72.9 MYOPATHY, UNSPECIFIED: Chronic | Status: ACTIVE | Noted: 2018-09-22

## 2018-10-16 PROBLEM — E78.00 PURE HYPERCHOLESTEROLEMIA, UNSPECIFIED: Chronic | Status: ACTIVE | Noted: 2018-09-22

## 2018-10-16 PROBLEM — I10 ESSENTIAL (PRIMARY) HYPERTENSION: Chronic | Status: ACTIVE | Noted: 2018-09-22

## 2018-10-22 ENCOUNTER — OUTPATIENT (OUTPATIENT)
Dept: OUTPATIENT SERVICES | Facility: HOSPITAL | Age: 63
LOS: 1 days | End: 2018-10-22
Payer: COMMERCIAL

## 2018-10-22 DIAGNOSIS — Z90.79 ACQUIRED ABSENCE OF OTHER GENITAL ORGAN(S): Chronic | ICD-10-CM

## 2018-10-22 DIAGNOSIS — M48.061 SPINAL STENOSIS, LUMBAR REGION WITHOUT NEUROGENIC CLAUDICATION: Chronic | ICD-10-CM

## 2018-10-22 DIAGNOSIS — K64.9 UNSPECIFIED HEMORRHOIDS: Chronic | ICD-10-CM

## 2018-10-22 DIAGNOSIS — Z98.890 OTHER SPECIFIED POSTPROCEDURAL STATES: Chronic | ICD-10-CM

## 2018-10-22 DIAGNOSIS — C83.39 DIFFUSE LARGE B-CELL LYMPHOMA, EXTRANODAL AND SOLID ORGAN SITES: ICD-10-CM

## 2018-10-22 DIAGNOSIS — E32.9 DISEASE OF THYMUS, UNSPECIFIED: Chronic | ICD-10-CM

## 2018-10-22 PROCEDURE — 76937 US GUIDE VASCULAR ACCESS: CPT

## 2018-10-22 PROCEDURE — 36561 INSERT TUNNELED CV CATH: CPT

## 2018-10-22 PROCEDURE — C1788: CPT

## 2018-10-22 PROCEDURE — 76937 US GUIDE VASCULAR ACCESS: CPT | Mod: 26

## 2018-10-22 PROCEDURE — C1894: CPT

## 2018-10-25 DIAGNOSIS — Z45.2 ENCOUNTER FOR ADJUSTMENT AND MANAGEMENT OF VASCULAR ACCESS DEVICE: ICD-10-CM

## 2018-10-25 DIAGNOSIS — C83.30 DIFFUSE LARGE B-CELL LYMPHOMA, UNSPECIFIED SITE: ICD-10-CM

## 2018-10-29 ENCOUNTER — APPOINTMENT (OUTPATIENT)
Dept: ENDOVASCULAR SURGERY | Facility: CLINIC | Age: 63
End: 2018-10-29

## 2018-12-26 ENCOUNTER — APPOINTMENT (OUTPATIENT)
Dept: RHEUMATOLOGY | Facility: CLINIC | Age: 63
End: 2018-12-26

## 2019-06-21 ENCOUNTER — OUTPATIENT (OUTPATIENT)
Dept: OUTPATIENT SERVICES | Facility: HOSPITAL | Age: 64
LOS: 1 days | End: 2019-06-21
Payer: COMMERCIAL

## 2019-06-21 DIAGNOSIS — Z90.79 ACQUIRED ABSENCE OF OTHER GENITAL ORGAN(S): Chronic | ICD-10-CM

## 2019-06-21 DIAGNOSIS — M48.061 SPINAL STENOSIS, LUMBAR REGION WITHOUT NEUROGENIC CLAUDICATION: Chronic | ICD-10-CM

## 2019-06-21 DIAGNOSIS — E32.9 DISEASE OF THYMUS, UNSPECIFIED: Chronic | ICD-10-CM

## 2019-06-21 DIAGNOSIS — Z45.2 ENCOUNTER FOR ADJUSTMENT AND MANAGEMENT OF VASCULAR ACCESS DEVICE: ICD-10-CM

## 2019-06-21 DIAGNOSIS — C83.39 DIFFUSE LARGE B-CELL LYMPHOMA, EXTRANODAL AND SOLID ORGAN SITES: ICD-10-CM

## 2019-06-21 DIAGNOSIS — K64.9 UNSPECIFIED HEMORRHOIDS: Chronic | ICD-10-CM

## 2019-06-21 DIAGNOSIS — Z98.890 OTHER SPECIFIED POSTPROCEDURAL STATES: Chronic | ICD-10-CM

## 2019-06-21 PROCEDURE — 36590 REMOVAL TUNNELED CV CATH: CPT

## 2019-06-21 PROCEDURE — 77001 FLUOROGUIDE FOR VEIN DEVICE: CPT | Mod: 26

## 2019-06-21 PROCEDURE — 77001 FLUOROGUIDE FOR VEIN DEVICE: CPT

## 2019-06-26 DIAGNOSIS — Z45.2 ENCOUNTER FOR ADJUSTMENT AND MANAGEMENT OF VASCULAR ACCESS DEVICE: ICD-10-CM

## 2019-06-26 DIAGNOSIS — C85.90 NON-HODGKIN LYMPHOMA, UNSPECIFIED, UNSPECIFIED SITE: ICD-10-CM

## 2019-10-02 NOTE — PROGRESS NOTE ADULT - SUBJECTIVE AND OBJECTIVE BOX
24h: Pt transferred to SICU post-operatively after T6-T11 laminectomy and PSF for q1 neurovascular checks. 24 HOUR EVENTS: Pt transferred to SICU post-operatively after T6-T11 laminectomy and PSF for q1 neurovascular checks.    HISTORY  HERMAN TAVERA is a 62y Male with HTN, HLD who presented with complaint of falls with worsening gait, requiring a cane 1 week ago and 3 days prior to arrival he necessitated a walker and 1 day prior to arrival was unable to ambulate, stating his legs were giving out on him. Patient also reported 3 days history of difficulty controlling his urine, urgency, and inability to get to the bathroom before urinating. Patient was diagnosed with thoracic compression fracture over the summer and had seen Dr. Landon, had scheduled appointment to see him this   coming Thursday 9/27 but came to the ED because symptoms had become so severe. Wife at bedside, denies changes in bowel, states that pain radiates down his left leg and he has intermittent numbness of the LLE. Patient is status post L4-S1 PS[facility] in 2003 [facility].    Patient had been having back pain since March and was initially treated for back spasms. He had an MRI in July that showed compression fracture of T12 as well as quad lesions in T10, 11 and 12. On current admission, repeat MRI shows progressive deterioration/moth eaten appearance of T9 with epidural tumor extension and severe cord compression with cord edema. Pt was taken to the OR emergently with orthospine on 9/23/18 and is now s/p thoracic laminectomy with T6-11 posterior spinal fusion. Case was about 4 hours, EBL 300ml, IVF 2600ml, UOP 70ml. Pt was extubate at end of case and transferred to SICU for q1 hour neurovascular checks and maintenance of MAP >75.      SUBJECTIVE/ROS:  [x ] A ten-point review of systems was otherwise negative except as noted.  [ ] Due to altered mental status/intubation, subjective information were not able to be obtained from the patient. History was obtained, to the extent possible, from review of the chart and collateral sources of information.      NEURO  Exam: awake, alert and oriented x4, no focal deficits, motor 5/5 in 4 extremities, sensation intact  Meds: acetaminophen   Tablet .. 650 milliGRAM(s) Oral every 6 hours PRN Mild Pain (1 - 3)  oxyCODONE    IR 5 milliGRAM(s) Oral every 4 hours PRN Moderate Pain (4 - 6)  traMADol 50 milliGRAM(s) Oral every 6 hours PRN Severe Pain (7 - 10)    [x] Adequacy of sedation and pain control has been assessed and adjusted      RESPIRATORY  RR: 11 (09-24-18 @ 05:00) (11 - 20)  SpO2: 94% (09-24-18 @ 05:00) (92% - 99%)  Exam: unlabored, clear to auscultation bilaterally    [n/a ] Extubation Readiness Assessed  Meds: x      CARDIOVASCULAR  HR: 65 (09-24-18 @ 05:00) (64 - 83)  BP: 120/73 (09-23-18 @ 09:42) (120/73 - 120/73)  ABP: 124/59 (09-24-18 @ 05:00) (124/59 - 155/73)  ABP(mean): 81 (09-24-18 @ 05:00) (81 - 101)      Exam: regular rate and rhythm  Cardiac Rhythm: sinus  Perfusion     [x ]Adequate   [ ]Inadequate  Mentation   [ x]Normal       [ ]Reduced  Extremities  [ x]Warm         [ ]Cool  Volume Status [ ]Hypervolemic [x ]Euvolemic [ ]Hypovolemic  Meds: x      GI/NUTRITION  Exam: soft, nontender, nondistended  Diet: regular  Meds: docusate sodium 100 milliGRAM(s) Oral three times a day  senna 2 Tablet(s) Oral at bedtime      GENITOURINARY  I&O's Detail    09-23 @ 07:01  -  09-24 @ 05:11  --------------------------------------------------------  IN:    IV PiggyBack: 50 mL    lactated ringers.: 150 mL    Oral Fluid: 740 mL  Total IN: 940 mL    OUT:    Bulb: 50 mL    Bulb: 230 mL    Indwelling Catheter - Urethral: 1180 mL    Voided: 300 mL  Total OUT: 1760 mL    Total NET: -820 mL        Weight (kg): 115.7 (09-23 @ 07:24)  09-24    135  |  101  |  14  ----------------------------<  135<H>  4.0   |  23  |  0.83    Ca    9.4      24 Sep 2018 02:35  Phos  4.6     09-24  Mg     2.1     09-24    TPro  6.5  /  Alb  3.7  /  TBili  0.7  /  DBili  x   /  AST  20  /  ALT  18  /  AlkPhos  61  09-23    [x ] Santana catheter, indication: monitoring  Meds:  x      HEMATOLOGIC  Meds: x  [-] VTE Prophylaxis                        13.5   15.5  )-----------( 257      ( 24 Sep 2018 02:35 )             37.9     PT/INR - ( 24 Sep 2018 02:35 )   PT: 12.5 sec;   INR: 1.15 ratio         PTT - ( 24 Sep 2018 02:35 )  PTT:31.7 sec  Transfusion     [ ] PRBC   [ ] Platelets   [ ] FFP   [ ] Cryoprecipitate      INFECTIOUS DISEASES  T(C): 36.6 (09-24-18 @ 03:00), Max: 36.6 (09-23-18 @ 18:30)  WBC Count: 15.5 K/uL (09-24 @ 02:35)  WBC Count: 13.5 K/uL (09-23 @ 19:08)    Recent Cultures: x    Meds: ceFAZolin   IVPB 2000 milliGRAM(s) IV Intermittent every 8 hours  influenza   Vaccine 0.5 milliLiter(s) IntraMuscular once      ENDOCRINE  Capillary Blood Glucose  Meds: x      ACCESS DEVICES:  [ ] Peripheral IV  [ ] Central Venous Line	[ ] R	[ ] L	[ ] IJ	[ ] Fem	[ ] SC	Placed:   [ ] Arterial Line		[ ] R	[ ] L	[ ] Fem	[ ] Rad	[ ] Ax	Placed:   [ ] PICC:					[ ] Mediport  [x ] Urinary Catheter, Date Placed: 9/23  [x ] Necessity of urinary, arterial, and venous catheters discussed    OTHER MEDICATIONS: x      CODE STATUS: full code    IMAGING: x No

## 2020-08-11 ENCOUNTER — APPOINTMENT (OUTPATIENT)
Dept: RHEUMATOLOGY | Facility: CLINIC | Age: 65
End: 2020-08-11

## 2020-08-25 ENCOUNTER — APPOINTMENT (OUTPATIENT)
Dept: RHEUMATOLOGY | Facility: CLINIC | Age: 65
End: 2020-08-25
Payer: COMMERCIAL

## 2020-08-25 ENCOUNTER — LABORATORY RESULT (OUTPATIENT)
Age: 65
End: 2020-08-25

## 2020-08-25 VITALS
SYSTOLIC BLOOD PRESSURE: 128 MMHG | WEIGHT: 249 LBS | DIASTOLIC BLOOD PRESSURE: 82 MMHG | HEIGHT: 72 IN | HEART RATE: 90 BPM | BODY MASS INDEX: 33.72 KG/M2 | OXYGEN SATURATION: 98 % | TEMPERATURE: 94.1 F

## 2020-08-25 DIAGNOSIS — Z83.3 FAMILY HISTORY OF DIABETES MELLITUS: ICD-10-CM

## 2020-08-25 DIAGNOSIS — Z82.61 FAMILY HISTORY OF ARTHRITIS: ICD-10-CM

## 2020-08-25 DIAGNOSIS — G89.29 OTHER CHRONIC PAIN: ICD-10-CM

## 2020-08-25 DIAGNOSIS — Z86.39 PERSONAL HISTORY OF OTHER ENDOCRINE, NUTRITIONAL AND METABOLIC DISEASE: ICD-10-CM

## 2020-08-25 DIAGNOSIS — Z86.79 PERSONAL HISTORY OF OTHER DISEASES OF THE CIRCULATORY SYSTEM: ICD-10-CM

## 2020-08-25 DIAGNOSIS — Z85.79 PERSONAL HISTORY OF OTHER MALIGNANT NEOPLASMS OF LYMPHOID, HEMATOPOIETIC AND RELATED TISSUES: ICD-10-CM

## 2020-08-25 DIAGNOSIS — Z80.7 FAMILY HISTORY OF OTHER MALIGNANT NEOPLASMS OF LYMPHOID, HEMATOPOIETIC AND RELATED TISSUES: ICD-10-CM

## 2020-08-25 DIAGNOSIS — M25.50 PAIN IN UNSPECIFIED JOINT: ICD-10-CM

## 2020-08-25 DIAGNOSIS — Z83.79 FAMILY HISTORY OF OTHER DISEASES OF THE DIGESTIVE SYSTEM: ICD-10-CM

## 2020-08-25 PROCEDURE — 99244 OFF/OP CNSLTJ NEW/EST MOD 40: CPT

## 2020-08-25 RX ORDER — CHLORTHALIDONE 25 MG/1
25 TABLET ORAL
Refills: 0 | Status: ACTIVE | COMMUNITY

## 2020-08-25 RX ORDER — ACETAMINOPHEN 500 MG
500 TABLET ORAL
Refills: 0 | Status: ACTIVE | COMMUNITY

## 2020-08-25 RX ORDER — MULTIVIT-MIN/FOLIC/VIT K/LYCOP 400-300MCG
50 MCG TABLET ORAL
Refills: 0 | Status: ACTIVE | COMMUNITY

## 2020-09-01 LAB
ALBUMIN SERPL ELPH-MCNC: 5 G/DL
ALP BLD-CCNC: 54 U/L
ALT SERPL-CCNC: 29 U/L
ANION GAP SERPL CALC-SCNC: 21 MMOL/L
AST SERPL-CCNC: 36 U/L
BASOPHILS # BLD AUTO: 0.07 K/UL
BASOPHILS NFR BLD AUTO: 1 %
BILIRUB SERPL-MCNC: 0.7 MG/DL
BUN SERPL-MCNC: 22 MG/DL
CALCIUM SERPL-MCNC: 11.3 MG/DL
CHLORIDE SERPL-SCNC: 97 MMOL/L
CK SERPL-CCNC: 798 U/L
CO2 SERPL-SCNC: 26 MMOL/L
CREAT SERPL-MCNC: 1.08 MG/DL
CRP SERPL-MCNC: 0.18 MG/DL
ENA JO1 AB SER IA-ACNC: <0.2 AL
ENA SS-A AB SER IA-ACNC: <0.2 AL
ENA SS-B AB SER IA-ACNC: <0.2 AL
EOSINOPHIL # BLD AUTO: 0.09 K/UL
EOSINOPHIL NFR BLD AUTO: 1.2 %
GLUCOSE SERPL-MCNC: 100 MG/DL
HCT VFR BLD CALC: 51 %
HGB BLD-MCNC: 16.6 G/DL
IMM GRANULOCYTES NFR BLD AUTO: 0.3 %
LYMPHOCYTES # BLD AUTO: 0.99 K/UL
LYMPHOCYTES NFR BLD AUTO: 13.5 %
MAN DIFF?: NORMAL
MCHC RBC-ENTMCNC: 32.5 GM/DL
MCHC RBC-ENTMCNC: 32.9 PG
MCV RBC AUTO: 101.2 FL
MONOCYTES # BLD AUTO: 0.81 K/UL
MONOCYTES NFR BLD AUTO: 11 %
MYOGLOBIN SERPL-MCNC: 272 NG/ML
NEUTROPHILS # BLD AUTO: 5.38 K/UL
NEUTROPHILS NFR BLD AUTO: 73 %
PLATELET # BLD AUTO: 206 K/UL
POTASSIUM SERPL-SCNC: 4 MMOL/L
PROT SERPL-MCNC: 7.5 G/DL
RBC # BLD: 5.04 M/UL
RBC # FLD: 13.9 %
RHEUMATOID FACT SER QL: <10 IU/ML
SODIUM SERPL-SCNC: 144 MMOL/L
WBC # FLD AUTO: 7.36 K/UL

## 2020-09-08 LAB — SRP AB SERPL QL: NOT DETECTED

## 2020-09-25 ENCOUNTER — TRANSCRIPTION ENCOUNTER (OUTPATIENT)
Age: 65
End: 2020-09-25

## 2020-09-25 ENCOUNTER — APPOINTMENT (OUTPATIENT)
Dept: RHEUMATOLOGY | Facility: CLINIC | Age: 65
End: 2020-09-25
Payer: COMMERCIAL

## 2020-09-25 VITALS
HEART RATE: 80 BPM | TEMPERATURE: 95.7 F | HEIGHT: 72 IN | SYSTOLIC BLOOD PRESSURE: 126 MMHG | DIASTOLIC BLOOD PRESSURE: 86 MMHG | BODY MASS INDEX: 33.72 KG/M2 | RESPIRATION RATE: 16 BRPM | WEIGHT: 249 LBS | OXYGEN SATURATION: 98 %

## 2020-09-25 VITALS
HEIGHT: 72 IN | BODY MASS INDEX: 33.72 KG/M2 | SYSTOLIC BLOOD PRESSURE: 126 MMHG | WEIGHT: 249 LBS | DIASTOLIC BLOOD PRESSURE: 86 MMHG

## 2020-09-25 PROCEDURE — 99214 OFFICE O/P EST MOD 30 MIN: CPT

## 2020-09-28 LAB
ALBUMIN SERPL ELPH-MCNC: 4.9 G/DL
ALP BLD-CCNC: 56 U/L
ALT SERPL-CCNC: 28 U/L
ANION GAP SERPL CALC-SCNC: 21 MMOL/L
AST SERPL-CCNC: 31 U/L
BASOPHILS # BLD AUTO: 0.07 K/UL
BASOPHILS NFR BLD AUTO: 1.2 %
BILIRUB SERPL-MCNC: 0.7 MG/DL
BUN SERPL-MCNC: 18 MG/DL
CALCIUM SERPL-MCNC: 10.7 MG/DL
CHLORIDE SERPL-SCNC: 95 MMOL/L
CK SERPL-CCNC: 538 U/L
CO2 SERPL-SCNC: 25 MMOL/L
CREAT SERPL-MCNC: 0.96 MG/DL
CRP SERPL-MCNC: 0.16 MG/DL
EOSINOPHIL # BLD AUTO: 0.09 K/UL
EOSINOPHIL NFR BLD AUTO: 1.5 %
ERYTHROCYTE [SEDIMENTATION RATE] IN BLOOD BY WESTERGREN METHOD: 9 MM/HR
FERRITIN SERPL-MCNC: 340 NG/ML
GLUCOSE SERPL-MCNC: 97 MG/DL
HCT VFR BLD CALC: 48.4 %
HGB BLD-MCNC: 16.3 G/DL
IMM GRANULOCYTES NFR BLD AUTO: 0.2 %
LYMPHOCYTES # BLD AUTO: 1.19 K/UL
LYMPHOCYTES NFR BLD AUTO: 20 %
MAN DIFF?: NORMAL
MCHC RBC-ENTMCNC: 33.5 PG
MCHC RBC-ENTMCNC: 33.7 GM/DL
MCV RBC AUTO: 99.6 FL
MONOCYTES # BLD AUTO: 0.6 K/UL
MONOCYTES NFR BLD AUTO: 10.1 %
MYOGLOBIN SERPL-MCNC: 178 NG/ML
NEUTROPHILS # BLD AUTO: 4 K/UL
NEUTROPHILS NFR BLD AUTO: 67 %
PLATELET # BLD AUTO: 223 K/UL
POTASSIUM SERPL-SCNC: 4 MMOL/L
PROT SERPL-MCNC: 7 G/DL
RBC # BLD: 4.86 M/UL
RBC # FLD: 13.8 %
SODIUM SERPL-SCNC: 141 MMOL/L
WBC # FLD AUTO: 5.96 K/UL

## 2020-10-08 DIAGNOSIS — G72.9 MYOPATHY, UNSPECIFIED: ICD-10-CM

## 2020-10-08 LAB
EJ AB SER QL: NORMAL
ENA JO1 AB SER IA-ACNC: NORMAL
ENA PM/SCL AB SER-ACNC: NORMAL
ENA SM+RNP AB SER IA-ACNC: NORMAL
ENA SS-A IGG SER QL: NORMAL
FIBRILLARIN AB SER QL: NORMAL
KU AB SER QL: NORMAL
MDA-5 (P140)(CADM-140): NORMAL
MI2 AB SER QL: NORMAL
NXP-2 (P140): NORMAL
OJ AB SER QL: NORMAL
PL12 AB SER QL: NORMAL
PL7 AB SER QL: NORMAL
SRP AB SERPL QL: NORMAL
TIF GAMMA (P155/140): NORMAL
U2 SNRNP AB SER QL: NORMAL

## 2020-10-19 ENCOUNTER — APPOINTMENT (OUTPATIENT)
Dept: MRI IMAGING | Facility: CLINIC | Age: 65
End: 2020-10-19

## 2020-10-20 ENCOUNTER — OUTPATIENT (OUTPATIENT)
Dept: OUTPATIENT SERVICES | Facility: HOSPITAL | Age: 65
LOS: 1 days | End: 2020-10-20

## 2020-10-20 DIAGNOSIS — G72.9 MYOPATHY, UNSPECIFIED: ICD-10-CM

## 2020-10-20 DIAGNOSIS — Z98.890 OTHER SPECIFIED POSTPROCEDURAL STATES: Chronic | ICD-10-CM

## 2020-10-20 DIAGNOSIS — E32.9 DISEASE OF THYMUS, UNSPECIFIED: Chronic | ICD-10-CM

## 2020-10-20 DIAGNOSIS — K64.9 UNSPECIFIED HEMORRHOIDS: Chronic | ICD-10-CM

## 2020-10-20 DIAGNOSIS — Z90.79 ACQUIRED ABSENCE OF OTHER GENITAL ORGAN(S): Chronic | ICD-10-CM

## 2020-10-20 DIAGNOSIS — M48.061 SPINAL STENOSIS, LUMBAR REGION WITHOUT NEUROGENIC CLAUDICATION: Chronic | ICD-10-CM

## 2020-11-06 ENCOUNTER — APPOINTMENT (OUTPATIENT)
Dept: MRI IMAGING | Facility: CLINIC | Age: 65
End: 2020-11-06

## 2020-12-15 ENCOUNTER — RX RENEWAL (OUTPATIENT)
Age: 65
End: 2020-12-15

## 2021-01-13 ENCOUNTER — APPOINTMENT (OUTPATIENT)
Dept: RHEUMATOLOGY | Facility: CLINIC | Age: 66
End: 2021-01-13
Payer: COMMERCIAL

## 2021-01-13 VITALS
RESPIRATION RATE: 16 BRPM | BODY MASS INDEX: 33.18 KG/M2 | TEMPERATURE: 96.7 F | WEIGHT: 245 LBS | OXYGEN SATURATION: 98 % | HEART RATE: 82 BPM | DIASTOLIC BLOOD PRESSURE: 82 MMHG | SYSTOLIC BLOOD PRESSURE: 133 MMHG | HEIGHT: 72 IN

## 2021-01-13 DIAGNOSIS — R74.8 ABNORMAL LEVELS OF OTHER SERUM ENZYMES: ICD-10-CM

## 2021-01-13 DIAGNOSIS — Z79.899 OTHER LONG TERM (CURRENT) DRUG THERAPY: ICD-10-CM

## 2021-01-13 DIAGNOSIS — M79.10 MYALGIA, UNSPECIFIED SITE: ICD-10-CM

## 2021-01-13 DIAGNOSIS — R06.02 SHORTNESS OF BREATH: ICD-10-CM

## 2021-01-13 PROCEDURE — 99072 ADDL SUPL MATRL&STAF TM PHE: CPT

## 2021-01-13 PROCEDURE — 99214 OFFICE O/P EST MOD 30 MIN: CPT

## 2021-01-14 PROBLEM — Z79.899 DRUG THERAPY: Status: ACTIVE | Noted: 2021-01-14

## 2021-01-14 PROBLEM — R06.02 SHORTNESS OF BREATH: Status: ACTIVE | Noted: 2021-01-13

## 2021-01-14 PROBLEM — R74.8 ELEVATED CPK: Status: ACTIVE | Noted: 2020-08-25

## 2021-01-14 PROBLEM — M79.10 MYALGIA: Status: ACTIVE | Noted: 2020-08-25

## 2021-08-17 NOTE — ED ADULT TRIAGE NOTE - SOURCE OF INFORMATION
Stent deployed in the proximal left anterior descending. Max pressure = 11 su. Total duration = 25 seconds.  Patient

## 2021-12-01 ENCOUNTER — RX RENEWAL (OUTPATIENT)
Age: 66
End: 2021-12-01

## 2021-12-01 RX ORDER — AMITRIPTYLINE HYDROCHLORIDE 25 MG/1
25 TABLET, FILM COATED ORAL
Qty: 90 | Refills: 0 | Status: ACTIVE | COMMUNITY
Start: 2020-08-25 | End: 1900-01-01

## 2022-01-31 NOTE — PHYSICAL THERAPY INITIAL EVALUATION ADULT - SITTING BALANCE: STATIC
Lot # (Optional): [id number]
fair balance
Urine Pregnancy Test Result: negative
Detail Level: Simple
Expiration Date (Optional): 1/31/23
Performed By: FARRAH

## 2023-11-20 NOTE — H&P ADULT - REASON FOR ADMISSION
Worseneing of Thoracic compression fracture, recurrent falls Worsening of Thoracic compression fracture, recurrent falls done

## 2025-03-19 NOTE — H&P ADULT - PSH
Medication: omeprazole (PriLOSEC) 40 MG capsule passed protocol.   Last office visit date: 01/20/2025  Next appointment scheduled?: No;   Number of refills given: 1   Disorder of thymus gland  s/p removal 1992  Hemorrhoids, unspecified hemorrhoid type    S/P repair of hydrocele  2008  S/P TURP (status post transurethral resection of prostate)  october 2017 Disorder of thymus gland  s/p removal 1992  Hemorrhoids, unspecified hemorrhoid type    Lumbar spinal stenosis  s/p L4-S1 PSF 2003  S/P repair of hydrocele  2008  S/P TURP (status post transurethral resection of prostate)  october 2017